# Patient Record
Sex: MALE | Race: WHITE | NOT HISPANIC OR LATINO
[De-identification: names, ages, dates, MRNs, and addresses within clinical notes are randomized per-mention and may not be internally consistent; named-entity substitution may affect disease eponyms.]

---

## 2017-06-12 ENCOUNTER — TRANSCRIPTION ENCOUNTER (OUTPATIENT)
Age: 67
End: 2017-06-12

## 2017-06-12 ENCOUNTER — APPOINTMENT (OUTPATIENT)
Dept: UROLOGY | Facility: CLINIC | Age: 67
End: 2017-06-12

## 2017-06-12 ENCOUNTER — RECORD ABSTRACTING (OUTPATIENT)
Age: 67
End: 2017-06-12

## 2017-06-12 VITALS — WEIGHT: 155 LBS | BODY MASS INDEX: 22.19 KG/M2 | HEIGHT: 70 IN

## 2017-06-12 DIAGNOSIS — M67.919 UNSPECIFIED DISORDER OF SYNOVIUM AND TENDON, UNSPECIFIED SHOULDER: ICD-10-CM

## 2017-06-12 DIAGNOSIS — H91.90 UNSPECIFIED HEARING LOSS, UNSPECIFIED EAR: ICD-10-CM

## 2017-06-12 DIAGNOSIS — K46.9 UNSPECIFIED ABDOMINAL HERNIA W/OUT OBSTRUCTION OR GANGRENE: ICD-10-CM

## 2017-06-12 DIAGNOSIS — Z82.0 FAMILY HISTORY OF EPILEPSY AND OTHER DISEASES OF THE NERVOUS SYSTEM: ICD-10-CM

## 2017-06-12 DIAGNOSIS — Z87.2 PERSONAL HISTORY OF DISEASES OF THE SKIN AND SUBCUTANEOUS TISSUE: ICD-10-CM

## 2017-06-12 DIAGNOSIS — Z86.69 PERSONAL HISTORY OF OTHER DISEASES OF THE NERVOUS SYSTEM AND SENSE ORGANS: ICD-10-CM

## 2017-06-12 DIAGNOSIS — Z78.9 OTHER SPECIFIED HEALTH STATUS: ICD-10-CM

## 2017-06-12 DIAGNOSIS — Z87.19 PERSONAL HISTORY OF OTHER DISEASES OF THE DIGESTIVE SYSTEM: ICD-10-CM

## 2017-06-12 DIAGNOSIS — Z80.0 FAMILY HISTORY OF MALIGNANT NEOPLASM OF DIGESTIVE ORGANS: ICD-10-CM

## 2017-06-15 LAB
APPEARANCE: CLEAR
BACTERIA UR CULT: NORMAL
BILIRUBIN URINE: NEGATIVE
BLOOD URINE: NEGATIVE
COLOR: YELLOW
CORE LAB FLUID CYTOLOGY: NORMAL
GLUCOSE QUALITATIVE U: NORMAL MG/DL
KETONES URINE: NEGATIVE
LEUKOCYTE ESTERASE URINE: NEGATIVE
NITRITE URINE: NEGATIVE
PH URINE: 5
PROLACTIN SERPL-MCNC: 6.1 NG/ML
PROTEIN URINE: NEGATIVE MG/DL
PSA SERPL-MCNC: 7.07 NG/ML
SPECIFIC GRAVITY URINE: 1.02
TESTOST SERPL-MCNC: 526.8 NG/DL
UROBILINOGEN URINE: NORMAL MG/DL

## 2017-06-19 ENCOUNTER — TRANSCRIPTION ENCOUNTER (OUTPATIENT)
Age: 67
End: 2017-06-19

## 2017-06-22 ENCOUNTER — OUTPATIENT (OUTPATIENT)
Dept: OUTPATIENT SERVICES | Facility: HOSPITAL | Age: 67
LOS: 1 days | End: 2017-06-22
Payer: COMMERCIAL

## 2017-06-22 PROCEDURE — A9585: CPT

## 2017-06-22 PROCEDURE — 72197 MRI PELVIS W/O & W/DYE: CPT

## 2017-06-22 PROCEDURE — 72197 MRI PELVIS W/O & W/DYE: CPT | Mod: 26

## 2017-06-26 ENCOUNTER — TRANSCRIPTION ENCOUNTER (OUTPATIENT)
Age: 67
End: 2017-06-26

## 2017-06-26 ENCOUNTER — APPOINTMENT (OUTPATIENT)
Dept: UROLOGY | Facility: CLINIC | Age: 67
End: 2017-06-26

## 2017-06-26 VITALS — HEART RATE: 72 BPM | RESPIRATION RATE: 16 BRPM | SYSTOLIC BLOOD PRESSURE: 128 MMHG | DIASTOLIC BLOOD PRESSURE: 74 MMHG

## 2017-07-11 ENCOUNTER — OTHER (OUTPATIENT)
Age: 67
End: 2017-07-11

## 2017-07-24 ENCOUNTER — APPOINTMENT (OUTPATIENT)
Dept: UROLOGY | Facility: CLINIC | Age: 67
End: 2017-07-24

## 2017-08-18 ENCOUNTER — APPOINTMENT (OUTPATIENT)
Dept: UROLOGY | Facility: CLINIC | Age: 67
End: 2017-08-18
Payer: COMMERCIAL

## 2017-08-18 PROCEDURE — 99213 OFFICE O/P EST LOW 20 MIN: CPT

## 2017-09-25 ENCOUNTER — APPOINTMENT (OUTPATIENT)
Dept: UROLOGY | Facility: CLINIC | Age: 67
End: 2017-09-25

## 2017-11-12 ENCOUNTER — LABORATORY RESULT (OUTPATIENT)
Age: 67
End: 2017-11-12

## 2017-11-13 ENCOUNTER — TRANSCRIPTION ENCOUNTER (OUTPATIENT)
Age: 67
End: 2017-11-13

## 2017-11-16 ENCOUNTER — MOBILE ON CALL (OUTPATIENT)
Age: 67
End: 2017-11-16

## 2017-11-20 ENCOUNTER — APPOINTMENT (OUTPATIENT)
Dept: UROLOGY | Facility: CLINIC | Age: 67
End: 2017-11-20
Payer: COMMERCIAL

## 2017-11-20 PROCEDURE — 99213 OFFICE O/P EST LOW 20 MIN: CPT

## 2017-11-21 LAB
APPEARANCE: CLEAR
BACTERIA: NEGATIVE
BILIRUBIN URINE: NEGATIVE
BLOOD URINE: NEGATIVE
COLOR: YELLOW
GLUCOSE QUALITATIVE U: NEGATIVE MG/DL
HYALINE CASTS: 0 /LPF
KETONES URINE: NEGATIVE
LEUKOCYTE ESTERASE URINE: NEGATIVE
MICROSCOPIC-UA: NORMAL
NITRITE URINE: NEGATIVE
PH URINE: 5
PROTEIN URINE: NEGATIVE MG/DL
RED BLOOD CELLS URINE: 1 /HPF
SPECIFIC GRAVITY URINE: 1.02
SQUAMOUS EPITHELIAL CELLS: 0 /HPF
UROBILINOGEN URINE: NEGATIVE MG/DL
WHITE BLOOD CELLS URINE: 1 /HPF

## 2018-02-16 ENCOUNTER — TRANSCRIPTION ENCOUNTER (OUTPATIENT)
Age: 68
End: 2018-02-16

## 2018-02-20 LAB — PSA SERPL-MCNC: 5.43 NG/ML

## 2018-02-26 ENCOUNTER — APPOINTMENT (OUTPATIENT)
Dept: UROLOGY | Facility: CLINIC | Age: 68
End: 2018-02-26
Payer: COMMERCIAL

## 2018-02-26 PROCEDURE — 51798 US URINE CAPACITY MEASURE: CPT

## 2018-02-26 PROCEDURE — 99214 OFFICE O/P EST MOD 30 MIN: CPT

## 2018-07-12 ENCOUNTER — TRANSCRIPTION ENCOUNTER (OUTPATIENT)
Age: 68
End: 2018-07-12

## 2018-07-16 LAB — PSA SERPL-MCNC: 4.08 NG/ML

## 2018-07-19 ENCOUNTER — FORM ENCOUNTER (OUTPATIENT)
Age: 68
End: 2018-07-19

## 2018-07-20 ENCOUNTER — TRANSCRIPTION ENCOUNTER (OUTPATIENT)
Age: 68
End: 2018-07-20

## 2018-07-20 ENCOUNTER — OUTPATIENT (OUTPATIENT)
Dept: OUTPATIENT SERVICES | Facility: HOSPITAL | Age: 68
LOS: 1 days | End: 2018-07-20
Payer: COMMERCIAL

## 2018-07-20 ENCOUNTER — APPOINTMENT (OUTPATIENT)
Dept: MRI IMAGING | Facility: HOSPITAL | Age: 68
End: 2018-07-20
Payer: COMMERCIAL

## 2018-07-20 LAB
4K SCORE CALCULATION: 4 %
FREE PSA: 0.6 NG/ML
PERCENT FREE PSA: 14 %
TOTAL PSA: 4.34 NG/ML

## 2018-07-20 PROCEDURE — 72197 MRI PELVIS W/O & W/DYE: CPT | Mod: 26

## 2018-07-20 PROCEDURE — 72197 MRI PELVIS W/O & W/DYE: CPT

## 2018-07-20 PROCEDURE — A9585: CPT

## 2018-07-24 ENCOUNTER — TRANSCRIPTION ENCOUNTER (OUTPATIENT)
Age: 68
End: 2018-07-24

## 2018-07-30 ENCOUNTER — OUTPATIENT (OUTPATIENT)
Dept: OUTPATIENT SERVICES | Facility: HOSPITAL | Age: 68
LOS: 1 days | End: 2018-07-30
Payer: COMMERCIAL

## 2018-07-30 ENCOUNTER — APPOINTMENT (OUTPATIENT)
Dept: CT IMAGING | Facility: HOSPITAL | Age: 68
End: 2018-07-30

## 2018-07-30 PROCEDURE — 70480 CT ORBIT/EAR/FOSSA W/O DYE: CPT

## 2018-07-30 PROCEDURE — 70480 CT ORBIT/EAR/FOSSA W/O DYE: CPT | Mod: 26

## 2018-08-06 ENCOUNTER — APPOINTMENT (OUTPATIENT)
Dept: UROLOGY | Facility: CLINIC | Age: 68
End: 2018-08-06
Payer: COMMERCIAL

## 2018-08-06 PROCEDURE — 51741 ELECTRO-UROFLOWMETRY FIRST: CPT

## 2018-08-06 PROCEDURE — 99214 OFFICE O/P EST MOD 30 MIN: CPT | Mod: 25

## 2018-09-06 ENCOUNTER — NON-APPOINTMENT (OUTPATIENT)
Age: 68
End: 2018-09-06

## 2018-09-06 ENCOUNTER — APPOINTMENT (OUTPATIENT)
Dept: FAMILY MEDICINE | Facility: CLINIC | Age: 68
End: 2018-09-06
Payer: COMMERCIAL

## 2018-09-06 VITALS
HEIGHT: 70 IN | OXYGEN SATURATION: 98 % | TEMPERATURE: 98.2 F | WEIGHT: 155 LBS | DIASTOLIC BLOOD PRESSURE: 75 MMHG | BODY MASS INDEX: 22.19 KG/M2 | HEART RATE: 67 BPM | SYSTOLIC BLOOD PRESSURE: 154 MMHG

## 2018-09-06 VITALS — DIASTOLIC BLOOD PRESSURE: 78 MMHG | SYSTOLIC BLOOD PRESSURE: 150 MMHG

## 2018-09-06 DIAGNOSIS — Z86.19 PERSONAL HISTORY OF OTHER INFECTIOUS AND PARASITIC DISEASES: ICD-10-CM

## 2018-09-06 PROCEDURE — 93000 ELECTROCARDIOGRAM COMPLETE: CPT

## 2018-09-06 PROCEDURE — 99204 OFFICE O/P NEW MOD 45 MIN: CPT | Mod: 25

## 2018-09-06 RX ORDER — CELECOXIB 100 MG/1
100 CAPSULE ORAL
Refills: 0 | Status: DISCONTINUED | COMMUNITY
End: 2018-09-06

## 2018-09-06 RX ORDER — FINASTERIDE 5 MG/1
5 TABLET, FILM COATED ORAL DAILY
Qty: 90 | Refills: 1 | Status: DISCONTINUED | COMMUNITY
Start: 2017-11-20 | End: 2018-09-06

## 2018-09-06 RX ORDER — PAPAVERINE HYDROCHLORIDE 30 MG/ML
30 INJECTION, SOLUTION INTRAVENOUS
Qty: 2 | Refills: 0 | Status: DISCONTINUED | COMMUNITY
Start: 2018-08-06 | End: 2018-09-06

## 2018-09-06 RX ORDER — LEVOCETIRIZINE DIHYDROCHLORIDE 5 MG/1
5 TABLET, FILM COATED ORAL
Refills: 0 | Status: DISCONTINUED | COMMUNITY
End: 2018-09-06

## 2018-09-06 NOTE — HISTORY OF PRESENT ILLNESS
[No Pertinent Cardiac History] : no history of aortic stenosis, atrial fibrillation, coronary artery disease, recent myocardial infarction, or implantable device/pacemaker [No Pertinent Pulmonary History] : no history of asthma, COPD, sleep apnea, or smoking [No Adverse Anesthesia Reaction] : no adverse anesthesia reaction in self or family member [(Patient denies any chest pain, claudication, dyspnea on exertion, orthopnea, palpitations or syncope)] : Patient denies any chest pain, claudication, dyspnea on exertion, orthopnea, palpitations or syncope [FreeTextEntry1] : R tympanoplasty [FreeTextEntry2] : 9/27/18 [FreeTextEntry3] : Dr Mancia [FreeTextEntry4] : 68 yo male here for preop clearance for R tympanoplasty. Pt with history of severe otitis of the right ear with resulting TM collapse and tinnitis/hearing loss. Going for surgery with Dr Mancia 9/27. Feeling well  today, no complaints. \par \par Last colonoscopy 10 years ago, needs to schedule f/u. \par \par Tdap- 7/06\par Td 2016\par Prevnar 13 01/16\par pneumovax 04/17\par Shingrix 3/2018 7/2018

## 2018-09-06 NOTE — RESULTS/DATA
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 Will get old EKG to compare

## 2018-09-06 NOTE — ASSESSMENT
[High Risk Surgery - Intraperitoneal, Intrathoracic or Supringuinal Vascular Procedures] : High Risk Surgery - Intraperitoneal, Intrathoracic or Supringuinal Vascular Procedures - No (0) [Ischemic Heart Disease] : Ischemic Heart Disease - No (0) [Congestive Heart Failure] : Congestive Heart Failure - No (0) [Prior Cerebrovascular Accident or TIA] : Prior Cerebrovascular Accident or TIA - No (0) [Creatinine >= 2mg/dL (1 Point)] : Creatinine >= 2mg/dL - No (0) [Insulin-dependent Diabetic (1 Point)] : Insulin-dependent Diabetic - No (0) [0] : 0 , RCRI Class: I, Risk of Post-Op Cardiac Complications: 0.4%, Procedure Risk: Low-Risk [Patient Optimized for Surgery] : Patient optimized for surgery [Modify anti-platelet treatment prior to procedure] : Modify anti-platelet treatment prior to procedure [Modify medications prior to procedure] : Modify medications prior to procedure [As per surgery] : as per surgery [FreeTextEntry4] : 66 yo male here for preop clearance for tympanoplasty. Patient is low risk for intermediate risk procedure. Medically optimized at this time.  [FreeTextEntry6] : stop 1 week prior [FreeTextEntry7] : NSAIDS stop 1 week prior

## 2018-09-27 ENCOUNTER — OUTPATIENT (OUTPATIENT)
Dept: OUTPATIENT SERVICES | Facility: HOSPITAL | Age: 68
LOS: 1 days | Discharge: HOME | End: 2018-09-27

## 2018-09-27 ENCOUNTER — RESULT REVIEW (OUTPATIENT)
Age: 68
End: 2018-09-27

## 2018-10-04 LAB — SURGICAL PATHOLOGY STUDY: SIGNIFICANT CHANGE UP

## 2018-12-21 ENCOUNTER — MEDICATION RENEWAL (OUTPATIENT)
Age: 68
End: 2018-12-21

## 2019-01-25 ENCOUNTER — APPOINTMENT (OUTPATIENT)
Dept: UROLOGY | Facility: CLINIC | Age: 69
End: 2019-01-25
Payer: MEDICARE

## 2019-01-25 VITALS — DIASTOLIC BLOOD PRESSURE: 70 MMHG | SYSTOLIC BLOOD PRESSURE: 130 MMHG

## 2019-01-25 PROCEDURE — 54235 NJX CORPORA CAVERNOSA RX AGT: CPT

## 2019-01-25 PROCEDURE — 93980 PENILE VASCULAR STUDY: CPT

## 2019-01-25 PROCEDURE — 99213 OFFICE O/P EST LOW 20 MIN: CPT | Mod: 25

## 2019-01-25 NOTE — HISTORY OF PRESENT ILLNESS
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 He notes marked improvement in urinary urgency. He still notes decreased urinary stream.\par He feels the libido has diminished since on finasteride; although, acknowledges diminished libido prior to starting. \par \par 8.6.2018\par MRI no evidence of areas concern\par 4K low risk\par complaining of a penile curvature for the last six months\par ? worsened recently does not preclude SI\par not painful\par libido has diminished; had preceded the implementation of finasteride\par low ejaculate volume since alfuzosin\par continues to be sexually active but less frequent\par Urinary symptoms improved on finasteride \par \par Patient is currently experiencing weak stream, but no urinary incontinence and no urinary urgency  \par The patient presents with complaints of nocturia (once (marked improvement)). \par The patient presents with complaints of erectile dysfunction (able to achieve intercourse; erections 7/10. curvature 30 %; ? change over last 6 months) \par  \par 1.25.2019\par patient returns for DUS\par he states that penile curvature preclude sexual intercourse\par he states that at the point of curvature there is penile instability\par he has avoided sexual intercourse

## 2019-01-25 NOTE — ASSESSMENT
[FreeTextEntry1] : The DUS today demonstrates excellent erection and rigidity.\par Patient was concerned that curvature/Peyronie precluded erection.\par Erection detumesced after 4 hours.\par Reassured\par We discussed treatment of Peyronie's disease with Xiaflex.\par We discussed at length the limitation of Xiaflex especially with reference to "waisting"\par We discussed risks and complication including but not limited to penile fracture.\par We discussed sildenafil so that he can assess impairment\par Discussed dose escalation by as needed (with maximum of 5 tablets =100mg)\par Warned re priapism risk and need for immediate intervention if erection lasts more than 2 hours.  Patient instructed to report to an emergency room and explicitly inform staff of his condition and need for treatment.\par Informational material provided to patient\par He will consider and return to assess progress

## 2019-03-14 ENCOUNTER — TRANSCRIPTION ENCOUNTER (OUTPATIENT)
Age: 69
End: 2019-03-14

## 2019-03-26 ENCOUNTER — MEDICATION RENEWAL (OUTPATIENT)
Age: 69
End: 2019-03-26

## 2019-07-25 ENCOUNTER — TRANSCRIPTION ENCOUNTER (OUTPATIENT)
Age: 69
End: 2019-07-25

## 2019-07-26 ENCOUNTER — APPOINTMENT (OUTPATIENT)
Dept: UROLOGY | Facility: CLINIC | Age: 69
End: 2019-07-26

## 2019-07-26 LAB — PSA SERPL-MCNC: 3.6 NG/ML

## 2020-01-30 ENCOUNTER — FORM ENCOUNTER (OUTPATIENT)
Age: 70
End: 2020-01-30

## 2020-01-31 ENCOUNTER — APPOINTMENT (OUTPATIENT)
Dept: UROLOGY | Facility: CLINIC | Age: 70
End: 2020-01-31
Payer: MEDICARE

## 2020-01-31 ENCOUNTER — OUTPATIENT (OUTPATIENT)
Dept: OUTPATIENT SERVICES | Facility: HOSPITAL | Age: 70
LOS: 1 days | End: 2020-01-31

## 2020-01-31 ENCOUNTER — APPOINTMENT (OUTPATIENT)
Dept: ULTRASOUND IMAGING | Facility: CLINIC | Age: 70
End: 2020-01-31
Payer: MEDICARE

## 2020-01-31 DIAGNOSIS — N48.6 INDURATION PENIS PLASTICA: ICD-10-CM

## 2020-01-31 PROCEDURE — 76770 US EXAM ABDO BACK WALL COMP: CPT | Mod: 26

## 2020-01-31 PROCEDURE — 51741 ELECTRO-UROFLOWMETRY FIRST: CPT

## 2020-01-31 PROCEDURE — 99214 OFFICE O/P EST MOD 30 MIN: CPT | Mod: 25

## 2020-01-31 PROCEDURE — 51798 US URINE CAPACITY MEASURE: CPT | Mod: 59

## 2020-01-31 RX ORDER — TADALAFIL 20 MG/1
20 TABLET ORAL
Qty: 10 | Refills: 0 | Status: ACTIVE | COMMUNITY
Start: 2020-01-31 | End: 1900-01-01

## 2020-01-31 NOTE — ASSESSMENT
[FreeTextEntry1] : Urinary symptoms improved on Uroxatral and Finasteride\par Patient however complains of the effect on sexual function.  He states that he is able to get an erection and have intercourse however he has no sense of enjoyment due to the poor quality of the ejaculate on Uroxatral and finasteride.  We discussed proceeding with intervention such as UROLIFT and attempt to stop medications.  He is reluctant to do this.  We discussed the UROLIFT procedure.\par \par We discussed the utilization of Cialis 5 mg daily in attempt to treat his urinary and sexual dysfunction.  He wished to proceed with this at this time.\par \par We discussed proceeding with a renal and bladder ultrasound.\par \par His PSA data was reviewed.  He has had a reduction in PSA on finasteride.  He is status post biopsy.  He is status post MRIs and a 4K.  We will repeat his PSA at this point.  His examination is unremarkable.\par \par Elevated PSA \par .s/p TUSGbx\par s/p MRI x2\par s/p 4K\par Will repeat PSA\par discussed  repeating PSA and or MRI\par continue to Monitor\par ,

## 2020-01-31 NOTE — HISTORY OF PRESENT ILLNESS
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 He notes marked improvement in urinary urgency. He still notes decreased urinary stream.\par He feels the libido has diminished since on finasteride; although, acknowledges diminished libido prior to starting. \par \par 8.6.2018\par MRI no evidence of areas concern\par 4K low risk\par complaining of a penile curvature for the last six months\par ? worsened recently does not preclude SI\par not painful\par libido has diminished; had preceded the implementation of finasteride\par low ejaculate volume since alfuzosin\par continues to be sexually active but less frequent\par Urinary symptoms improved on finasteride \par \par Patient is currently experiencing weak stream, but no urinary incontinence and no urinary urgency  \par The patient presents with complaints of nocturia (once (marked improvement)). \par The patient presents with complaints of erectile dysfunction (able to achieve intercourse; erections 7/10. curvature 30 %; ? change over last 6 months) \par  \par 1.25.2019\par patient returns for DUS\par he states that penile curvature preclude sexual intercourse\par he states that at the point of curvature there is penile instability\par he has avoided sexual intercourse\par \par \par 1.31.2020\par sexual dysfunction takes viagra\par it works but "annoying" to have to take medication\par low ejaculate volume on alfuzosin\par "sex less pleasurable"\par \par urinary symptoms; IPSS 13\par slow stream in AM before takes medication\par during day does well\par urinary retention after ear surgery\par

## 2020-01-31 NOTE — PHYSICAL EXAM
[General Appearance - Well Developed] : well developed [Normal Appearance] : normal appearance [Abdomen Soft] : soft [Abdomen Tenderness] : non-tender [Costovertebral Angle Tenderness] : no ~M costovertebral angle tenderness [Urethral Meatus] : meatus normal [Penis Abnormality] : normal circumcised penis [Epididymis] : the epididymides were normal [Testes Tenderness] : no tenderness of the testes [Prostate Tenderness] : the prostate was not tender [No Prostate Nodules] : no prostate nodules [Prostate Size ___ (0-4)] : prostate size [unfilled] (scale: 0-4) [Skin Color & Pigmentation] : normal skin color and pigmentation [Edema] : no peripheral edema [Exaggerated Use Of Accessory Muscles For Inspiration] : no accessory muscle use [Oriented To Time, Place, And Person] : oriented to person, place, and time [Normal Station and Gait] : the gait and station were normal for the patient's age [Inguinal Lymph Nodes Enlarged Bilaterally] : inguinal [FreeTextEntry1] : PVR  33

## 2020-02-02 LAB
APPEARANCE: ABNORMAL
BACTERIA: NEGATIVE
BILIRUBIN URINE: NEGATIVE
BLOOD URINE: NEGATIVE
COLOR: YELLOW
GLUCOSE QUALITATIVE U: NEGATIVE
HYALINE CASTS: 0 /LPF
KETONES URINE: NEGATIVE
LEUKOCYTE ESTERASE URINE: NEGATIVE
MICROSCOPIC-UA: NORMAL
NITRITE URINE: NEGATIVE
PH URINE: 5
PROTEIN URINE: NEGATIVE
RED BLOOD CELLS URINE: 1 /HPF
SPECIFIC GRAVITY URINE: 1.02
SQUAMOUS EPITHELIAL CELLS: 0 /HPF
UROBILINOGEN URINE: NORMAL
WHITE BLOOD CELLS URINE: 0 /HPF

## 2020-02-03 ENCOUNTER — TRANSCRIPTION ENCOUNTER (OUTPATIENT)
Age: 70
End: 2020-02-03

## 2020-02-03 LAB — PSA SERPL-MCNC: 3.5 NG/ML

## 2020-04-14 ENCOUNTER — APPOINTMENT (OUTPATIENT)
Dept: FAMILY MEDICINE | Facility: CLINIC | Age: 70
End: 2020-04-14

## 2020-04-27 ENCOUNTER — APPOINTMENT (OUTPATIENT)
Dept: UROLOGY | Facility: CLINIC | Age: 70
End: 2020-04-27

## 2020-06-24 ENCOUNTER — APPOINTMENT (OUTPATIENT)
Dept: DERMATOLOGY | Facility: CLINIC | Age: 70
End: 2020-06-24

## 2020-12-17 ENCOUNTER — TRANSCRIPTION ENCOUNTER (OUTPATIENT)
Age: 70
End: 2020-12-17

## 2020-12-17 ENCOUNTER — RX RENEWAL (OUTPATIENT)
Age: 70
End: 2020-12-17

## 2020-12-21 ENCOUNTER — TRANSCRIPTION ENCOUNTER (OUTPATIENT)
Age: 70
End: 2020-12-21

## 2021-01-05 ENCOUNTER — TRANSCRIPTION ENCOUNTER (OUTPATIENT)
Age: 71
End: 2021-01-05

## 2021-01-05 LAB
ANION GAP SERPL CALC-SCNC: 13 MMOL/L
APPEARANCE: CLEAR
BACTERIA: NEGATIVE
BILIRUBIN URINE: NEGATIVE
BLOOD URINE: NEGATIVE
BUN SERPL-MCNC: 12 MG/DL
CALCIUM SERPL-MCNC: 9.6 MG/DL
CHLORIDE SERPL-SCNC: 105 MMOL/L
CO2 SERPL-SCNC: 24 MMOL/L
COLOR: YELLOW
CREAT SERPL-MCNC: 1.07 MG/DL
GLUCOSE QUALITATIVE U: NEGATIVE
GLUCOSE SERPL-MCNC: 74 MG/DL
HYALINE CASTS: 3 /LPF
KETONES URINE: NORMAL
LEUKOCYTE ESTERASE URINE: NEGATIVE
MICROSCOPIC-UA: NORMAL
NITRITE URINE: NEGATIVE
PH URINE: 5.5
POTASSIUM SERPL-SCNC: 4.7 MMOL/L
PROTEIN URINE: NORMAL
PSA SERPL-MCNC: 3.45 NG/ML
RED BLOOD CELLS URINE: 2 /HPF
SODIUM SERPL-SCNC: 143 MMOL/L
SPECIFIC GRAVITY URINE: 1.03
SQUAMOUS EPITHELIAL CELLS: 1 /HPF
UROBILINOGEN URINE: NORMAL
WHITE BLOOD CELLS URINE: 1 /HPF

## 2021-01-06 ENCOUNTER — TRANSCRIPTION ENCOUNTER (OUTPATIENT)
Age: 71
End: 2021-01-06

## 2021-01-08 ENCOUNTER — APPOINTMENT (OUTPATIENT)
Dept: UROLOGY | Facility: CLINIC | Age: 71
End: 2021-01-08
Payer: MEDICARE

## 2021-01-08 PROCEDURE — 99213 OFFICE O/P EST LOW 20 MIN: CPT

## 2021-01-08 NOTE — HISTORY OF PRESENT ILLNESS
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 He notes marked improvement in urinary urgency. He still notes decreased urinary stream.\par He feels the libido has diminished since on finasteride; although, acknowledges diminished libido prior to starting. \par \par 8.6.2018\par MRI no evidence of areas concern\par 4K low risk\par complaining of a penile curvature for the last six months\par ? worsened recently does not preclude SI\par not painful\par libido has diminished; had preceded the implementation of finasteride\par low ejaculate volume since alfuzosin\par continues to be sexually active but less frequent\par Urinary symptoms improved on finasteride \par \par Patient is currently experiencing weak stream, but no urinary incontinence and no urinary urgency  \par The patient presents with complaints of nocturia (once (marked improvement)). \par The patient presents with complaints of erectile dysfunction (able to achieve intercourse; erections 7/10. curvature 30 %; ? change over last 6 months) \par  \par 1.25.2019\par patient returns for DUS\par he states that penile curvature preclude sexual intercourse\par he states that at the point of curvature there is penile instability\par he has avoided sexual intercourse\par \par \par 1.31.2020\par sexual dysfunction takes viagra\par it works but "annoying" to have to take medication\par low ejaculate volume on alfuzosin\par "sex less pleasurable"\par \par urinary symptoms; IPSS 13\par slow stream in AM before takes medication\par during day does well\par urinary retention after ear surgery\par \par 1.8.2021\par patient not havng sexual intercourse \par "agreement" \par did not enjoy having taking medication\par \par IPSS 10\par ? dribbling\par on afluzosin and finasteride [Urinary Incontinence] : no urinary incontinence [Urinary Retention] : no urinary retention [Straining] : no straining

## 2021-01-08 NOTE — ASSESSMENT
[FreeTextEntry1] : Urinary symptoms improved on Uroxatral and Finasteride\par Patient however complains of the effect on sexual function.  He states that he is able to get an erection and have intercourse however he has no sense of enjoyment due to the poor quality of the ejaculate on Uroxatral and finasteride.  We discussed proceeding with intervention such as UROLIFT and attempt to stop medications.  He is reluctant to do this.  We discussed the UROLIFT procedure.\par \par We discussed the utilization of Cialis 5 mg daily in attempt to treat his urinary and sexual dysfunction.  He wished to proceed with this at this time.\par \par We discussed proceeding with a renal and bladder ultrasound.\par \par His PSA data was reviewed.  He has had a reduction in PSA on finasteride.  He is status post biopsy.  He is status post MRIs and a 4K.  We will repeat his PSA at this point.  His examination is unremarkable.\par \par Elevated PSA \par .s/p TUSGbx\par s/p MRI x2\par s/p 4K\par Will repeat PSA\par discussed  repeating PSA and or MRI\par continue to Monitor\par ,\par 1.8.2021\par labs reviewed\par PSA stable after finasteride\par s/p MRI \par s/p biopsy\par EXAM CHANGED with assymetry of (R lobe greated than left)  recommend repeat MRI\par The [facility]  expressed fully understanding of the information provided, the consequences and the management.\par \par LUTs \par low flow and PVR (flow nondiagnostic\par on maximal management\par interested in UROLIFT after COVID crisis\par patient has been given Informational materials given to patient.\par \par \par sexual function\par responds to to PD5 with excellent erection\par however not willing to take PD5\par couple is not interested in addressing\par

## 2021-01-08 NOTE — PHYSICAL EXAM
[General Appearance - Well Developed] : well developed [Normal Appearance] : normal appearance [Abdomen Soft] : soft [Abdomen Tenderness] : non-tender [Costovertebral Angle Tenderness] : no ~M costovertebral angle tenderness [Urethral Meatus] : meatus normal [Penis Abnormality] : normal circumcised penis [Epididymis] : the epididymides were normal [Testes Tenderness] : no tenderness of the testes [Prostate Tenderness] : the prostate was not tender [No Prostate Nodules] : no prostate nodules [FreeTextEntry1] : PVR 4; prostate assymetry R>L; ? irregular

## 2021-02-05 ENCOUNTER — APPOINTMENT (OUTPATIENT)
Dept: MRI IMAGING | Facility: HOSPITAL | Age: 71
End: 2021-02-05

## 2021-02-05 ENCOUNTER — OUTPATIENT (OUTPATIENT)
Dept: OUTPATIENT SERVICES | Facility: HOSPITAL | Age: 71
LOS: 1 days | End: 2021-02-05
Payer: MEDICARE

## 2021-02-05 PROCEDURE — A9585: CPT

## 2021-02-05 PROCEDURE — 72197 MRI PELVIS W/O & W/DYE: CPT

## 2021-02-05 PROCEDURE — G1004: CPT

## 2021-02-05 PROCEDURE — 72197 MRI PELVIS W/O & W/DYE: CPT | Mod: 26,ME

## 2021-02-06 ENCOUNTER — TRANSCRIPTION ENCOUNTER (OUTPATIENT)
Age: 71
End: 2021-02-06

## 2021-02-08 ENCOUNTER — TRANSCRIPTION ENCOUNTER (OUTPATIENT)
Age: 71
End: 2021-02-08

## 2021-03-23 ENCOUNTER — NON-APPOINTMENT (OUTPATIENT)
Age: 71
End: 2021-03-23

## 2021-03-23 ENCOUNTER — APPOINTMENT (OUTPATIENT)
Dept: FAMILY MEDICINE | Facility: CLINIC | Age: 71
End: 2021-03-23
Payer: MEDICARE

## 2021-03-23 VITALS
HEIGHT: 70 IN | RESPIRATION RATE: 16 BRPM | SYSTOLIC BLOOD PRESSURE: 148 MMHG | TEMPERATURE: 97.9 F | BODY MASS INDEX: 21.47 KG/M2 | OXYGEN SATURATION: 98 % | WEIGHT: 150 LBS | HEART RATE: 65 BPM | DIASTOLIC BLOOD PRESSURE: 77 MMHG

## 2021-03-23 VITALS — DIASTOLIC BLOOD PRESSURE: 78 MMHG | SYSTOLIC BLOOD PRESSURE: 132 MMHG

## 2021-03-23 DIAGNOSIS — R05 COUGH: ICD-10-CM

## 2021-03-23 DIAGNOSIS — Z13.21 ENCOUNTER FOR SCREENING FOR NUTRITIONAL DISORDER: ICD-10-CM

## 2021-03-23 DIAGNOSIS — R21 RASH AND OTHER NONSPECIFIC SKIN ERUPTION: ICD-10-CM

## 2021-03-23 DIAGNOSIS — Z13.29 ENCOUNTER FOR SCREENING FOR OTHER SUSPECTED ENDOCRINE DISORDER: ICD-10-CM

## 2021-03-23 DIAGNOSIS — Z13.1 ENCOUNTER FOR SCREENING FOR DIABETES MELLITUS: ICD-10-CM

## 2021-03-23 PROCEDURE — G0439: CPT

## 2021-03-23 PROCEDURE — 99214 OFFICE O/P EST MOD 30 MIN: CPT | Mod: 25

## 2021-03-23 PROCEDURE — 36415 COLL VENOUS BLD VENIPUNCTURE: CPT

## 2021-03-23 PROCEDURE — 93000 ELECTROCARDIOGRAM COMPLETE: CPT

## 2021-03-23 RX ORDER — PAPAVERINE HYDROCHLORIDE 30 MG/ML
30 INJECTION, SOLUTION INTRAVENOUS
Qty: 2 | Refills: 0 | Status: DISCONTINUED | COMMUNITY
Start: 2019-01-08 | End: 2021-03-23

## 2021-03-23 NOTE — HISTORY OF PRESENT ILLNESS
[de-identified] : 69 yo male here for CPE. \par \par Patient suffers from chronic heartburn, worsening over last few months. Has had negative esophageal manometry in the past, 20 years ago. Last night had bad reflux that woke him up. H/o small hiatal hernia. Sometimes feels like he has to belch but it's getting stuck. Mom with h/o achalasia. Last colonoscopy 11 years ago. Takes periodic omeprazole. Also with history of colonic polyps. \par \par Admitting to periodic right sided chest. Mostly at rest. Pain is dull. No FH of heart disease. Not exercising currently. \par \par C/o rash on his back and legs after the COVID vaccine. Took h2 blocker and benadryl for a while with minimal improvement. Last week he did a prednisone taper which improved rash.

## 2021-03-23 NOTE — ASSESSMENT
[FreeTextEntry1] : CPE- Well exam, comprehensive labs ordered. EKG unchanged. GI referral for colonoscopy. F/u labs

## 2021-03-23 NOTE — HEALTH RISK ASSESSMENT
[Good] : ~his/her~  mood as  good [0] : 2) Feeling down, depressed, or hopeless: Not at all (0) [With Significant Other] : lives with significant other [Employed] : employed [] :  [Fully functional (bathing, dressing, toileting, transferring, walking, feeding)] : Fully functional (bathing, dressing, toileting, transferring, walking, feeding) [Fully functional (using the telephone, shopping, preparing meals, housekeeping, doing laundry, using] : Fully functional and needs no help or supervision to perform IADLs (using the telephone, shopping, preparing meals, housekeeping, doing laundry, using transportation, managing medications and managing finances)

## 2021-03-26 DIAGNOSIS — R79.89 OTHER SPECIFIED ABNORMAL FINDINGS OF BLOOD CHEMISTRY: ICD-10-CM

## 2021-03-26 LAB
25(OH)D3 SERPL-MCNC: 40.3 NG/ML
ALBUMIN SERPL ELPH-MCNC: 4.4 G/DL
ALP BLD-CCNC: 98 U/L
ALT SERPL-CCNC: 19 U/L
ANION GAP SERPL CALC-SCNC: 11 MMOL/L
APPEARANCE: CLEAR
AST SERPL-CCNC: 19 U/L
BACTERIA: NEGATIVE
BASOPHILS # BLD AUTO: 0.04 K/UL
BASOPHILS NFR BLD AUTO: 0.6 %
BILIRUB SERPL-MCNC: 0.5 MG/DL
BILIRUBIN URINE: NEGATIVE
BLOOD URINE: NEGATIVE
BUN SERPL-MCNC: 13 MG/DL
CALCIUM SERPL-MCNC: 8.8 MG/DL
CHLORIDE SERPL-SCNC: 106 MMOL/L
CHOLEST SERPL-MCNC: 212 MG/DL
CO2 SERPL-SCNC: 26 MMOL/L
COLOR: NORMAL
CREAT SERPL-MCNC: 0.92 MG/DL
EOSINOPHIL # BLD AUTO: 0.24 K/UL
EOSINOPHIL NFR BLD AUTO: 3.5 %
ESTIMATED AVERAGE GLUCOSE: 105 MG/DL
FOLATE SERPL-MCNC: >20 NG/ML
GLUCOSE QUALITATIVE U: NEGATIVE
GLUCOSE SERPL-MCNC: 103 MG/DL
HBA1C MFR BLD HPLC: 5.3 %
HCT VFR BLD CALC: 45.5 %
HDLC SERPL-MCNC: 45 MG/DL
HGB BLD-MCNC: 14.7 G/DL
HYALINE CASTS: 0 /LPF
IMM GRANULOCYTES NFR BLD AUTO: 3.2 %
KETONES URINE: NEGATIVE
LDLC SERPL CALC-MCNC: 132 MG/DL
LEUKOCYTE ESTERASE URINE: NEGATIVE
LYMPHOCYTES # BLD AUTO: 1.45 K/UL
LYMPHOCYTES NFR BLD AUTO: 21 %
MAN DIFF?: NORMAL
MCHC RBC-ENTMCNC: 29.5 PG
MCHC RBC-ENTMCNC: 32.3 GM/DL
MCV RBC AUTO: 91.2 FL
MICROSCOPIC-UA: NORMAL
MONOCYTES # BLD AUTO: 0.46 K/UL
MONOCYTES NFR BLD AUTO: 6.7 %
NEUTROPHILS # BLD AUTO: 4.5 K/UL
NEUTROPHILS NFR BLD AUTO: 65 %
NITRITE URINE: NEGATIVE
NONHDLC SERPL-MCNC: 167 MG/DL
PH URINE: 6.5
PLATELET # BLD AUTO: 221 K/UL
POTASSIUM SERPL-SCNC: 4.2 MMOL/L
PROT SERPL-MCNC: 6.4 G/DL
PROTEIN URINE: NEGATIVE
RBC # BLD: 4.99 M/UL
RBC # FLD: 12.7 %
RED BLOOD CELLS URINE: 1 /HPF
SODIUM SERPL-SCNC: 142 MMOL/L
SPECIFIC GRAVITY URINE: 1.02
SQUAMOUS EPITHELIAL CELLS: 0 /HPF
T4 FREE SERPL-MCNC: 1.4 NG/DL
TRIGL SERPL-MCNC: 177 MG/DL
TSH SERPL-ACNC: 1.99 UIU/ML
UROBILINOGEN URINE: NORMAL
VIT B12 SERPL-MCNC: 444 PG/ML
WBC # FLD AUTO: 6.91 K/UL
WHITE BLOOD CELLS URINE: 0 /HPF

## 2021-03-30 ENCOUNTER — APPOINTMENT (OUTPATIENT)
Dept: GASTROENTEROLOGY | Facility: CLINIC | Age: 71
End: 2021-03-30
Payer: MEDICARE

## 2021-03-30 VITALS
WEIGHT: 155 LBS | OXYGEN SATURATION: 98 % | DIASTOLIC BLOOD PRESSURE: 73 MMHG | BODY MASS INDEX: 22.19 KG/M2 | SYSTOLIC BLOOD PRESSURE: 135 MMHG | HEART RATE: 73 BPM | HEIGHT: 70 IN | TEMPERATURE: 98.1 F

## 2021-03-30 PROCEDURE — 36415 COLL VENOUS BLD VENIPUNCTURE: CPT

## 2021-03-30 PROCEDURE — 99204 OFFICE O/P NEW MOD 45 MIN: CPT | Mod: 25

## 2021-03-30 NOTE — PHYSICAL EXAM
[General Appearance - Alert] : alert [General Appearance - In No Acute Distress] : in no acute distress [General Appearance - Well Nourished] : well nourished [General Appearance - Well Developed] : well developed [General Appearance - Well-Appearing] : healthy appearing [Sclera] : the sclera and conjunctiva were normal [PERRL With Normal Accommodation] : pupils were equal in size, round, and reactive to light [Extraocular Movements] : extraocular movements were intact [Outer Ear] : the ears and nose were normal in appearance [Both Tympanic Membranes Were Examined] : both tympanic membranes were normal [Oropharynx] : the oropharynx was normal [Neck Appearance] : the appearance of the neck was normal [Neck Cervical Mass (___cm)] : no neck mass was observed [Thyroid Diffuse Enlargement] : the thyroid was not enlarged [Jugular Venous Distention Increased] : there was no jugular-venous distention [Thyroid Nodule] : there were no palpable thyroid nodules [Respiration, Rhythm And Depth] : normal respiratory rhythm and effort [Auscultation Breath Sounds / Voice Sounds] : lungs were clear to auscultation bilaterally [Heart Rate And Rhythm] : heart rate was normal and rhythm regular [Heart Sounds] : normal S1 and S2 [Heart Sounds Gallop] : no gallops [Murmurs] : no murmurs [Heart Sounds Pericardial Friction Rub] : no pericardial rub [Full Pulse] : the pedal pulses are present [Edema] : there was no peripheral edema [Bowel Sounds] : normal bowel sounds [Abdomen Soft] : soft [Abdomen Tenderness] : non-tender [Abdomen Mass (___ Cm)] : no abdominal mass palpated [Cervical Lymph Nodes Enlarged Posterior Bilaterally] : posterior cervical [Cervical Lymph Nodes Enlarged Anterior Bilaterally] : anterior cervical [No CVA Tenderness] : no ~M costovertebral angle tenderness [No Spinal Tenderness] : no spinal tenderness [Abnormal Walk] : normal gait [Nail Clubbing] : no clubbing  or cyanosis of the fingernails [Musculoskeletal - Swelling] : no joint swelling seen [Motor Tone] : muscle strength and tone were normal [Skin Color & Pigmentation] : normal skin color and pigmentation [Skin Turgor] : normal skin turgor [] : no rash [No Focal Deficits] : no focal deficits [Oriented To Time, Place, And Person] : oriented to person, place, and time [Impaired Insight] : insight and judgment were intact [Affect] : the affect was normal

## 2021-04-01 LAB — UREA BREATH TEST QL: NEGATIVE

## 2021-04-04 NOTE — ASSESSMENT
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 Of note he has a history of achalasia in his mom. \par \par GERD, belching, issues with intermittent dysphagia to liquids: prior work up was 20+ yrs ago. Now with 3 mos of worsening symptoms. Differential is broad and includes GERD/esophagitis, achalasia, motility issue (i.e. gastric outlet obstruction). \par - H pylori breath test today\par - plan for x-ray barium esophagram \par - EGD for structural evaluation\par - referral for esophageal manometry \par \par Screening colonoscopy: \par - plan for colonoscopy at the time of the EGD\par - r/b/a/i of the procedure reviewed and patient understands and agrees to proceed\par - reviewed bowel preparation instructions\par - needs COVID-19 PCR within 48-72 hrs of the procedure\par - needs escort after the procedure\par \par Follow up post-procedure

## 2021-04-04 NOTE — HISTORY OF PRESENT ILLNESS
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 \par \par Patient reports he has had reflux for years. \par Feels like things get stuck in [facility]par He had manometry and work up done a long time ago (20 yrs ago), as well as some imaging studies at that time and they didn't find anything. \par He just lived with it and symptoms were intermittent, didn't seem as bad. He took antacids and it just seemed to come and go\par \par Over the past 3 mos symptoms have worsened. When he goes to sleep at night he has a lot of belching\par Now he is belching and sensation that stuff is "getting stuck". \par Can no longer drink water at night - because it literally feels like it won't go down. \par Will sometimes get pain in chest at night. not exertional. It feels visceral and on R side, lateral to sternum. \par \par Doesn't notice dysphagia with solids - eats steak, meats etc. Doesn't feel like stuff gets stuck. No early satiety. \par Even liquids are fine most of the time, but there are times that liquids seem to bother him. \par Mother had achalaisa. \par \par Takes a PPI once a week. Did take it regularly for a while. It may have helped for a little. \par Last 3 mos these are new worsening symptoms. \par \par New thing is chest pain, nighttime reflux. He has elevated head of the bed. \par \par Patient presents with worsening heartburn x last few months. Has had episodes of bad reflux that wake him out of sleep. \par Has a history of a small hiatal hernia. \par Intermittently takes omeprazole\par \par No odynophagia. No coughing or choking with swallowing. No signfiicant weight loss. No fevers, chills, nausea, vomiting. \par He tries not to lie down after eating. He also does not even drink water at night anymore.\par \par Sometimes feels like he needs to belch but "it's getting stuck". \par Mom has a history of achalasia. \par \par Negative esophageal manometry in the past, 20 yrs ago. \par \par He does have some R sided period chest discomfort. It is dull. He underwent an EKG recently with his PMD and was noted to have stable t wave changes in EKG. The thought is that this is atypical in nature and related to GERD. He was referred to cardiologist. \par \par Last colonoscopy was 11 yrs ago. No history of polyps, just diverticulosis. \par \par Prior EGD 20+ yrs ago \par Last colonoscopy 12/19/2013: Dr. Morrow\par \par PMH: \par BPH \par \par PSH: \par Mastoidectomy 2004 (he notes lots of ear surgeries that have worsened his ability to hear) \par cutoff cuff (2008)\par tympanoplasty (2014)\par hernia surgery (2015 (bilateral inguinal and femoral hernias and has mesh) \par \par ALL: morphine itching \par \par MEDS: \par finasteride \par afluzosine \par \par FH: \par father pancreatic cancer - was an alcoholic \par Mother had achalasia, dementia, alzheimers\par \par SH: \par drinks few times a week\par no smoking \par no illicit drug use \par

## 2021-04-09 ENCOUNTER — APPOINTMENT (OUTPATIENT)
Dept: DERMATOLOGY | Facility: CLINIC | Age: 71
End: 2021-04-09
Payer: MEDICARE

## 2021-04-09 DIAGNOSIS — B07.8 OTHER VIRAL WARTS: ICD-10-CM

## 2021-04-09 DIAGNOSIS — D22.9 MELANOCYTIC NEVI, UNSPECIFIED: ICD-10-CM

## 2021-04-09 PROCEDURE — 99204 OFFICE O/P NEW MOD 45 MIN: CPT | Mod: 25

## 2021-04-09 PROCEDURE — 17110 DESTRUCTION B9 LES UP TO 14: CPT

## 2021-04-14 ENCOUNTER — APPOINTMENT (OUTPATIENT)
Dept: HEART AND VASCULAR | Facility: CLINIC | Age: 71
End: 2021-04-14

## 2021-04-17 ENCOUNTER — LABORATORY RESULT (OUTPATIENT)
Age: 71
End: 2021-04-17

## 2021-04-20 ENCOUNTER — OUTPATIENT (OUTPATIENT)
Dept: OUTPATIENT SERVICES | Facility: HOSPITAL | Age: 71
LOS: 1 days | Discharge: HOME | End: 2021-04-20
Payer: MEDICARE

## 2021-04-20 ENCOUNTER — APPOINTMENT (OUTPATIENT)
Dept: GASTROENTEROLOGY | Facility: HOSPITAL | Age: 71
End: 2021-04-20

## 2021-04-20 PROCEDURE — 91010 ESOPHAGUS MOTILITY STUDY: CPT

## 2021-04-20 PROCEDURE — 91010 ESOPHAGUS MOTILITY STUDY: CPT | Mod: 26

## 2021-04-27 ENCOUNTER — LABORATORY RESULT (OUTPATIENT)
Age: 71
End: 2021-04-27

## 2021-04-28 ENCOUNTER — APPOINTMENT (OUTPATIENT)
Dept: DERMATOLOGY | Facility: CLINIC | Age: 71
End: 2021-04-28

## 2021-04-30 ENCOUNTER — RESULT REVIEW (OUTPATIENT)
Age: 71
End: 2021-04-30

## 2021-04-30 ENCOUNTER — OUTPATIENT (OUTPATIENT)
Dept: OUTPATIENT SERVICES | Facility: HOSPITAL | Age: 71
LOS: 1 days | Discharge: HOME | End: 2021-04-30
Payer: MEDICARE

## 2021-04-30 ENCOUNTER — APPOINTMENT (OUTPATIENT)
Dept: GASTROENTEROLOGY | Facility: HOSPITAL | Age: 71
End: 2021-04-30

## 2021-04-30 PROCEDURE — 82657 ENZYME CELL ACTIVITY: CPT

## 2021-04-30 PROCEDURE — 43235 EGD DIAGNOSTIC BRUSH WASH: CPT

## 2021-04-30 PROCEDURE — 88305 TISSUE EXAM BY PATHOLOGIST: CPT | Mod: 26

## 2021-04-30 PROCEDURE — 91034 GASTROESOPHAGEAL REFLUX TEST: CPT | Mod: 26

## 2021-04-30 PROCEDURE — 88305 TISSUE EXAM BY PATHOLOGIST: CPT

## 2021-04-30 PROCEDURE — 43239 EGD BIOPSY SINGLE/MULTIPLE: CPT

## 2021-04-30 PROCEDURE — C1889: CPT

## 2021-04-30 PROCEDURE — 45378 DIAGNOSTIC COLONOSCOPY: CPT

## 2021-04-30 PROCEDURE — 45380 COLONOSCOPY AND BIOPSY: CPT | Mod: PT

## 2021-05-04 LAB
B-GALACTOSIDASE TISS-CCNT: 236.1 U/G — SIGNIFICANT CHANGE UP
DISACCHARIDASES TSMI-IMP: SIGNIFICANT CHANGE UP
ISOMALTASE TISS-CCNT: 18.5 U/G — SIGNIFICANT CHANGE UP
PALATINASE TISS-CCNT: 64.8 U/G — SIGNIFICANT CHANGE UP
SUCRASE TISS-CCNT: 4.6 U/G — LOW

## 2021-05-05 LAB — SURGICAL PATHOLOGY STUDY: SIGNIFICANT CHANGE UP

## 2021-05-17 ENCOUNTER — OUTPATIENT (OUTPATIENT)
Dept: OUTPATIENT SERVICES | Facility: HOSPITAL | Age: 71
LOS: 1 days | End: 2021-05-17
Payer: MEDICARE

## 2021-05-17 ENCOUNTER — APPOINTMENT (OUTPATIENT)
Dept: RADIOLOGY | Facility: HOSPITAL | Age: 71
End: 2021-05-17
Payer: MEDICARE

## 2021-05-17 PROCEDURE — 74220 X-RAY XM ESOPHAGUS 1CNTRST: CPT

## 2021-05-17 PROCEDURE — 74220 X-RAY XM ESOPHAGUS 1CNTRST: CPT | Mod: 26

## 2021-05-19 ENCOUNTER — NON-APPOINTMENT (OUTPATIENT)
Age: 71
End: 2021-05-19

## 2021-05-20 ENCOUNTER — APPOINTMENT (OUTPATIENT)
Age: 71
End: 2021-05-20
Payer: MEDICARE

## 2021-05-20 DIAGNOSIS — R13.10 DYSPHAGIA, UNSPECIFIED: ICD-10-CM

## 2021-05-20 PROCEDURE — 99214 OFFICE O/P EST MOD 30 MIN: CPT | Mod: 95

## 2021-05-26 ENCOUNTER — TRANSCRIPTION ENCOUNTER (OUTPATIENT)
Age: 71
End: 2021-05-26

## 2021-07-12 ENCOUNTER — RESULT REVIEW (OUTPATIENT)
Age: 71
End: 2021-07-12

## 2021-07-12 ENCOUNTER — APPOINTMENT (OUTPATIENT)
Dept: ORTHOPEDIC SURGERY | Facility: CLINIC | Age: 71
End: 2021-07-12
Payer: MEDICARE

## 2021-07-12 ENCOUNTER — OUTPATIENT (OUTPATIENT)
Dept: OUTPATIENT SERVICES | Facility: HOSPITAL | Age: 71
LOS: 1 days | End: 2021-07-12
Payer: MEDICARE

## 2021-07-12 VITALS — BODY MASS INDEX: 22.19 KG/M2 | WEIGHT: 155 LBS | RESPIRATION RATE: 16 BRPM | HEIGHT: 70 IN

## 2021-07-12 DIAGNOSIS — M77.11 LATERAL EPICONDYLITIS, RIGHT ELBOW: ICD-10-CM

## 2021-07-12 PROCEDURE — 73080 X-RAY EXAM OF ELBOW: CPT

## 2021-07-12 PROCEDURE — 99204 OFFICE O/P NEW MOD 45 MIN: CPT

## 2021-07-12 PROCEDURE — 73080 X-RAY EXAM OF ELBOW: CPT | Mod: 26,RT

## 2021-07-16 ENCOUNTER — APPOINTMENT (OUTPATIENT)
Dept: DERMATOLOGY | Facility: CLINIC | Age: 71
End: 2021-07-16

## 2021-07-23 ENCOUNTER — LABORATORY RESULT (OUTPATIENT)
Age: 71
End: 2021-07-23

## 2021-07-23 ENCOUNTER — APPOINTMENT (OUTPATIENT)
Dept: UROLOGY | Facility: CLINIC | Age: 71
End: 2021-07-23
Payer: MEDICARE

## 2021-07-23 PROCEDURE — 51798 US URINE CAPACITY MEASURE: CPT

## 2021-07-23 PROCEDURE — 99214 OFFICE O/P EST MOD 30 MIN: CPT

## 2021-07-23 PROCEDURE — 51741 ELECTRO-UROFLOWMETRY FIRST: CPT

## 2021-07-23 NOTE — PHYSICAL EXAM
[Normal Appearance] : normal appearance [Well Groomed] : well groomed [Urethral Meatus] : meatus normal [Penis Abnormality] : normal circumcised penis [Epididymis] : the epididymides were normal [Testes Tenderness] : no tenderness of the testes [Prostate Tenderness] : the prostate was not tender [No Prostate Nodules] : no prostate nodules [FreeTextEntry1] : ; then voided again PVR 28 prostate assymetry R>L;smooth; mild left caput epididymal tenderness

## 2021-07-23 NOTE — ASSESSMENT
[FreeTextEntry1] : Urinary symptoms improved on Uroxatral and Finasteride\par Patient however complains of the effect on sexual function.  He states that he is able to get an erection and have intercourse however he has no sense of enjoyment due to the poor quality of the ejaculate on Uroxatral and finasteride.  We discussed proceeding with intervention such as UROLIFT and attempt to stop medications.  He is reluctant to do this.  We discussed the UROLIFT procedure.\par \par We discussed the utilization of Cialis 5 mg daily in attempt to treat his urinary and sexual dysfunction.  He wished to proceed with this at this time.\par \par We discussed proceeding with a renal and bladder ultrasound.\par \par His PSA data was reviewed.  He has had a reduction in PSA on finasteride.  He is status post biopsy.  He is status post MRIs and a 4K.  We will repeat his PSA at this point.  His examination is unremarkable.\par \par Elevated PSA \par .s/p TUSGbx\par s/p MRI x2\par s/p 4K\par Will repeat PSA\par discussed  repeating PSA and or MRI\par continue to Monitor\par ,\par 1.8.2021\par labs reviewed\par PSA stable after finasteride\par s/p MRI \par s/p biopsy\par EXAM CHANGED with assymetry of (R lobe greated than left)  recommend repeat MRI\par The ISH MATAMOROS  expressed fully understanding of the information provided, the consequences and the management.\par \par LUTs \par low flow and PVR (flow nondiagnostic\par on maximal management\par interested in UROLIFT after COVID crisis\par patient has been given Informational materials given to patient.\par \par \par sexual function\par responds to to PD5 with excellent erection\par however not willing to take PD5\par couple is not interested in addressing\par \par \par 7.23.2021\par LUTS intiial ; flow 8 cc /sec voided volume 88\par then voided to PVR 29\par reviewed UROLIFT\par discussed indication\par will attempt tadalafil 5 mg daily\par stop alfuzosin\par \par erectile dysfunction\par not willing to take PD5 prn\par discussed daily tadalafil for LUTS and ED\par will repeat endocrine\par \par PSA will repeat\par \par testicular discomfort\par epididymal tenderness\par We discussed conservative management with: a.  nonsteroidal anti-inflammatories, b. ice prn and c. scrotal support.\par \par \par \par \par

## 2021-07-23 NOTE — HISTORY OF PRESENT ILLNESS
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 He notes marked improvement in urinary urgency. He still notes decreased urinary stream.\par He feels the libido has diminished since on finasteride; although, acknowledges diminished libido prior to starting. \par \par 8.6.2018\par MRI no evidence of areas concern\par 4K low risk\par complaining of a penile curvature for the last six months\par ? worsened recently does not preclude SI\par not painful\par libido has diminished; had preceded the implementation of finasteride\par low ejaculate volume since alfuzosin\par continues to be sexually active but less frequent\par Urinary symptoms improved on finasteride \par \par Patient is currently experiencing weak stream, but no urinary incontinence and no urinary urgency  \par The patient presents with complaints of nocturia (once (marked improvement)). \par The patient presents with complaints of erectile dysfunction (able to achieve intercourse; erections 7/10. curvature 30 %; ? change over last 6 months) \par  \par 1.25.2019\par patient returns for DUS\par he states that penile curvature preclude sexual intercourse\par he states that at the point of curvature there is penile instability\par he has avoided sexual intercourse\par \par \par 1.31.2020\par sexual dysfunction takes viagra\par it works but "annoying" to have to take medication\par low ejaculate volume on alfuzosin\par "sex less pleasurable"\par \par urinary symptoms; IPSS 13\par slow stream in AM before takes medication\par during day does well\par urinary retention after ear surgery\par \par 1.8.2021\par patient not having sexual intercourse \par "agreement" \par did not enjoy having taking medication\par \par IPSS 10\par ? dribbling\par on afluzosin and finasteride\par \par 7.23.2021\par complaining of mild left testicular discomfor tx 3 weeks\par mild gnawing\par no swelling erythema etc\par occasional masturbation\par no sexual activity\par poor quality erection not sufficient for sexual intercourse\par continues on alfuzosin and finasteride\par

## 2021-07-24 ENCOUNTER — TRANSCRIPTION ENCOUNTER (OUTPATIENT)
Age: 71
End: 2021-07-24

## 2021-07-27 ENCOUNTER — TRANSCRIPTION ENCOUNTER (OUTPATIENT)
Age: 71
End: 2021-07-27

## 2021-08-16 ENCOUNTER — APPOINTMENT (OUTPATIENT)
Dept: GASTROENTEROLOGY | Facility: CLINIC | Age: 71
End: 2021-08-16
Payer: MEDICARE

## 2021-08-16 VITALS
TEMPERATURE: 99.1 F | HEART RATE: 54 BPM | HEIGHT: 70 IN | WEIGHT: 157 LBS | OXYGEN SATURATION: 97 % | SYSTOLIC BLOOD PRESSURE: 132 MMHG | DIASTOLIC BLOOD PRESSURE: 69 MMHG | BODY MASS INDEX: 22.48 KG/M2

## 2021-08-16 DIAGNOSIS — Z00.00 ENCOUNTER FOR GENERAL ADULT MEDICAL EXAMINATION W/OUT ABNORMAL FINDINGS: ICD-10-CM

## 2021-08-16 PROCEDURE — 99214 OFFICE O/P EST MOD 30 MIN: CPT

## 2021-08-16 RX ORDER — OMEPRAZOLE 20 MG/1
20 TABLET, DELAYED RELEASE ORAL DAILY
Qty: 30 | Refills: 5 | Status: ACTIVE | COMMUNITY
Start: 2021-08-16 | End: 1900-01-01

## 2021-08-18 LAB
ALBUMIN SERPL ELPH-MCNC: 4.3 G/DL
ALP BLD-CCNC: 104 U/L
ALT SERPL-CCNC: 14 U/L
ANION GAP SERPL CALC-SCNC: 12 MMOL/L
AST SERPL-CCNC: 25 U/L
BASOPHILS # BLD AUTO: 0.03 K/UL
BASOPHILS NFR BLD AUTO: 0.4 %
BILIRUB SERPL-MCNC: 0.5 MG/DL
BUN SERPL-MCNC: 14 MG/DL
CALCIUM SERPL-MCNC: 9.2 MG/DL
CHLORIDE SERPL-SCNC: 106 MMOL/L
CHOLEST SERPL-MCNC: 189 MG/DL
CO2 SERPL-SCNC: 25 MMOL/L
CREAT SERPL-MCNC: 1.13 MG/DL
EOSINOPHIL # BLD AUTO: 0.22 K/UL
EOSINOPHIL NFR BLD AUTO: 3.2 %
GLUCOSE SERPL-MCNC: 93 MG/DL
HCT VFR BLD CALC: 43 %
HDLC SERPL-MCNC: 36 MG/DL
HGB BLD-MCNC: 14.5 G/DL
IMM GRANULOCYTES NFR BLD AUTO: 0.3 %
LDLC SERPL CALC-MCNC: 127 MG/DL
LYMPHOCYTES # BLD AUTO: 1.56 K/UL
LYMPHOCYTES NFR BLD AUTO: 22.5 %
MAN DIFF?: NORMAL
MCHC RBC-ENTMCNC: 30.6 PG
MCHC RBC-ENTMCNC: 33.7 GM/DL
MCV RBC AUTO: 90.7 FL
MONOCYTES # BLD AUTO: 0.41 K/UL
MONOCYTES NFR BLD AUTO: 5.9 %
NEUTROPHILS # BLD AUTO: 4.7 K/UL
NEUTROPHILS NFR BLD AUTO: 67.7 %
NONHDLC SERPL-MCNC: 154 MG/DL
PLATELET # BLD AUTO: 248 K/UL
POTASSIUM SERPL-SCNC: 4.6 MMOL/L
PROT SERPL-MCNC: 6.3 G/DL
RBC # BLD: 4.74 M/UL
RBC # FLD: 12.6 %
SODIUM SERPL-SCNC: 143 MMOL/L
TRIGL SERPL-MCNC: 132 MG/DL
WBC # FLD AUTO: 6.94 K/UL

## 2022-02-01 ENCOUNTER — APPOINTMENT (OUTPATIENT)
Dept: UROLOGY | Facility: CLINIC | Age: 72
End: 2022-02-01
Payer: MEDICARE

## 2022-02-01 VITALS
HEART RATE: 85 BPM | DIASTOLIC BLOOD PRESSURE: 98 MMHG | SYSTOLIC BLOOD PRESSURE: 166 MMHG | BODY MASS INDEX: 22.96 KG/M2 | WEIGHT: 160 LBS | TEMPERATURE: 97.9 F

## 2022-02-01 VITALS — SYSTOLIC BLOOD PRESSURE: 171 MMHG | DIASTOLIC BLOOD PRESSURE: 80 MMHG

## 2022-02-01 DIAGNOSIS — N50.812 LEFT TESTICULAR PAIN: ICD-10-CM

## 2022-02-01 PROCEDURE — 99214 OFFICE O/P EST MOD 30 MIN: CPT

## 2022-02-01 PROCEDURE — 51741 ELECTRO-UROFLOWMETRY FIRST: CPT

## 2022-02-01 PROCEDURE — 51798 US URINE CAPACITY MEASURE: CPT

## 2022-02-01 PROCEDURE — 76870 US EXAM SCROTUM: CPT

## 2022-02-01 NOTE — HISTORY OF PRESENT ILLNESS
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 He notes marked improvement in urinary urgency. He still notes decreased urinary stream.\par He feels the libido has diminished since on finasteride; although, acknowledges diminished libido prior to starting. \par \par 8.6.2018\par MRI no evidence of areas concern\par 4K low risk\par complaining of a penile curvature for the last six months\par ? worsened recently does not preclude SI\par not painful\par libido has diminished; had preceded the implementation of finasteride\par low ejaculate volume since alfuzosin\par continues to be sexually active but less frequent\par Urinary symptoms improved on finasteride \par \par Patient is currently experiencing weak stream, but no urinary incontinence and no urinary urgency  \par The patient presents with complaints of nocturia (once (marked improvement)). \par The patient presents with complaints of erectile dysfunction (able to achieve intercourse; erections 7/10. curvature 30 %; ? change over last 6 months) \par  \par 1.25.2019\par patient returns for DUS\par he states that penile curvature preclude sexual intercourse\par he states that at the point of curvature there is penile instability\par he has avoided sexual intercourse\par \par \par 1.31.2020\par sexual dysfunction takes viagra\par it works but "annoying" to have to take medication\par low ejaculate volume on alfuzosin\par "sex less pleasurable"\par \par urinary symptoms; IPSS 13\par slow stream in AM before takes medication\par during day does well\par urinary retention after ear surgery\par \par 1.8.2021\par patient not having sexual intercourse \par "agreement" \par did not enjoy having taking medication\par \par IPSS 10\par ? dribbling\par on afluzosin and finasteride\par \par 7.23.2021\par complaining of mild left testicular discomfor tx 3 weeks\par mild gnawing\par no swelling erythema etc\par occasional masturbation\par no sexual activity\par poor quality erection not sufficient for sexual intercourse\par continues on alfuzosin and finasteride\par \par 2.1.2022\par testicular discomfort persists\par sexuall function\par penetrates with poor erection; did not respond to daily cialis\par started sildenafil 40 mg did not improve\par experience premature ejaculation due to anxiety relating to performance\par decreased flow on finasteride and alfuzosin

## 2022-02-01 NOTE — LETTER BODY
[FreeTextEntry2] : RICH Parker [FreeTextEntry1] :  \par Dear Doctor,\par \par \par I had the opportunity to see your patient, Mr. ISH MATAMOROS in followup. I am enclosing my office note for your information.\par \par I will keep you informed of any developments.\par \par Feel free to contact me if you have any questions.\par \par Sincerely,\par \par William Jiménez MD, FACS\par Professor of Urology\par [facility]\par \par [address]\par [place], [place] [address]\par \par [address]\par [place], [place] [address]\par \par Office Telephone \par [phone]\par \par Fax\par [phone]\par

## 2022-02-01 NOTE — ASSESSMENT
[FreeTextEntry1] : Urinary symptoms improved on Uroxatral and Finasteride\par Patient however complains of the effect on sexual function.  He states that he is able to get an erection and have intercourse however he has no sense of enjoyment due to the poor quality of the ejaculate on Uroxatral and finasteride.  We discussed proceeding with intervention such as UROLIFT and attempt to stop medications.  He is reluctant to do this.  We discussed the UROLIFT procedure.\par \par We discussed the utilization of Cialis 5 mg daily in attempt to treat his urinary and sexual dysfunction.  He wished to proceed with this at this time.\par \par We discussed proceeding with a renal and bladder ultrasound.\par \par His PSA data was reviewed.  He has had a reduction in PSA on finasteride.  He is status post biopsy.  He is status post MRIs and a 4K.  We will repeat his PSA at this point.  His examination is unremarkable.\par \par Elevated PSA \par .s/p TUSGbx\par s/p MRI x2\par s/p 4K\par Will repeat PSA\par discussed  repeating PSA and or MRI\par continue to Monitor\par ,\par 1.8.2021\par labs reviewed\par PSA stable after finasteride\par s/p MRI \par s/p biopsy\par EXAM CHANGED with assymetry of (R lobe greated than left)  recommend repeat MRI\par The ISH MATAMOROS  expressed fully understanding of the information provided, the consequences and the management.\par \par LUTs \par low flow and PVR (flow nondiagnostic\par on maximal management\par interested in UROLIFT after COVID crisis\par patient has been given Informational materials given to patient.\par \par \par sexual function\par responds to to PD5 with excellent erection\par however not willing to take PD5\par couple is not interested in addressing\par \par \par 7.23.2021\par LUTS intiial ; flow 8 cc /sec voided volume 88\par then voided to PVR 29\par reviewed UROLIFT\par discussed indication\par will attempt tadalafil 5 mg daily\par stop alfuzosin\par \par erectile dysfunction\par not willing to take PD5 prn\par discussed daily tadalafil for LUTS and ED\par will repeat endocrine\par \par PSA will repeat\par \par testicular discomfort\par epididymal tenderness\par We discussed conservative management with: a.  nonsteroidal anti-inflammatories, b. ice prn and c. scrotal support.\par \par \par 2/1/2022\par \par Patient returns with urinary symptoms.  His flow is low however the volume is nondiagnostic.  He has a small postvoid residual.  He complains of some urgency.  We previously discussed proceeding with a UroLift procedure.  He is reluctant to proceed with any intervention.  He is not happy with the retreatment rate reported for UroLift.  We discussed Rezum.  Previously we had discussed utilizing Vesicare.  Apparently he did not proceed with this.  We discussed attempting Myrbetriq however he was hypertensive and has had episodes of elevated blood pressure.  We will initiate therapy with Ditropan XL 5 mg.  We discussed the risk of urinary retention and side effects.\par Eliminate caffeinated products\par 1 coffee and several cola's per day\par \par He discussed complains of left testicular discomfort.  It is not severe.  It is a gnawing feeling.  There is no hernia.  His examination is unremarkable.  His left epididymis is slightly tender.  But this is within normal limits.  We discussed nonsteroidal anti-inflammatories.  We discussed the need to avoid self examination.  We will proceed with a scrotal ultrasound\par \par His sexual dysfunction continues to be a problem.  He is increased the dose of sildenafil to 40 mg.  He is not does progressed beyond this.  He states he is able to have intercourse with a soft penis.  He experiences premature ejaculation due to his frustration.  He states that he is avoiding sexual activity and feels this is sad.  We discussed dose escalation.\par \par He has an abnormal prostate exam.  He will undergo a repeat PSA.  He is status post MRI and biopsy.\par \par Plan\par .1 creatinine, PSA\par 2. urinalysis\par 3. conservative management\par 4. ditropan Xl 5 mg\par 5. fu  4 weeks

## 2022-02-01 NOTE — PHYSICAL EXAM
[Urethral Meatus] : meatus normal [Penis Abnormality] : normal circumcised penis [Epididymis] : the epididymides were normal [Testes Tenderness] : no tenderness of the testes [Prostate Tenderness] : the prostate was not tender [No Prostate Nodules] : no prostate nodules [FreeTextEntry1] :  prostate assymetry R>L;smooth; mild left caput epididymal tenderness; PVR 21; flow 6.6cc/sec volume 73.2

## 2022-02-02 ENCOUNTER — TRANSCRIPTION ENCOUNTER (OUTPATIENT)
Age: 72
End: 2022-02-02

## 2022-02-02 LAB
APPEARANCE: CLEAR
BACTERIA: NEGATIVE
BILIRUBIN URINE: NEGATIVE
BLOOD URINE: NEGATIVE
COLOR: YELLOW
CREAT SERPL-MCNC: 1.01 MG/DL
GLUCOSE QUALITATIVE U: NEGATIVE
HYALINE CASTS: 0 /LPF
KETONES URINE: NEGATIVE
LEUKOCYTE ESTERASE URINE: NEGATIVE
MICROSCOPIC-UA: NORMAL
NITRITE URINE: NEGATIVE
PH URINE: 6
PROTEIN URINE: NORMAL
PSA FREE FLD-MCNC: 24 %
PSA FREE SERPL-MCNC: 1 NG/ML
PSA SERPL-MCNC: 4.12 NG/ML
RED BLOOD CELLS URINE: 4 /HPF
SPECIFIC GRAVITY URINE: 1.02
SQUAMOUS EPITHELIAL CELLS: 0 /HPF
UROBILINOGEN URINE: NORMAL
WHITE BLOOD CELLS URINE: 1 /HPF

## 2022-02-07 ENCOUNTER — TRANSCRIPTION ENCOUNTER (OUTPATIENT)
Age: 72
End: 2022-02-07

## 2022-02-10 ENCOUNTER — TRANSCRIPTION ENCOUNTER (OUTPATIENT)
Age: 72
End: 2022-02-10

## 2022-02-14 ENCOUNTER — NON-APPOINTMENT (OUTPATIENT)
Age: 72
End: 2022-02-14

## 2022-02-14 DIAGNOSIS — R74.8 ABNORMAL LEVELS OF OTHER SERUM ENZYMES: ICD-10-CM

## 2022-02-14 LAB
25(OH)D3 SERPL-MCNC: 38.2 NG/ML
ALBUMIN SERPL ELPH-MCNC: 4.9 G/DL
ALP BLD-CCNC: 124 U/L
ALT SERPL-CCNC: 29 U/L
ANION GAP SERPL CALC-SCNC: 20 MMOL/L
APPEARANCE: CLEAR
AST SERPL-CCNC: 28 U/L
BACTERIA: NEGATIVE
BASOPHILS # BLD AUTO: 0.03 K/UL
BASOPHILS NFR BLD AUTO: 0.4 %
BILIRUB SERPL-MCNC: 0.4 MG/DL
BILIRUBIN URINE: NEGATIVE
BLOOD URINE: NEGATIVE
BUN SERPL-MCNC: 18 MG/DL
CALCIUM SERPL-MCNC: 10 MG/DL
CHLORIDE SERPL-SCNC: 101 MMOL/L
CHOLEST SERPL-MCNC: 210 MG/DL
CO2 SERPL-SCNC: 22 MMOL/L
COLOR: NORMAL
CREAT SERPL-MCNC: 1.07 MG/DL
EOSINOPHIL # BLD AUTO: 0.29 K/UL
EOSINOPHIL NFR BLD AUTO: 3.9 %
ESTIMATED AVERAGE GLUCOSE: 108 MG/DL
FOLATE SERPL-MCNC: 16.6 NG/ML
GLUCOSE QUALITATIVE U: NEGATIVE
GLUCOSE SERPL-MCNC: 91 MG/DL
HBA1C MFR BLD HPLC: 5.4 %
HCT VFR BLD CALC: 49.1 %
HDLC SERPL-MCNC: 34 MG/DL
HGB BLD-MCNC: 15.7 G/DL
HYALINE CASTS: 0 /LPF
IMM GRANULOCYTES NFR BLD AUTO: 0.3 %
KETONES URINE: NEGATIVE
LDLC SERPL CALC-MCNC: 125 MG/DL
LEUKOCYTE ESTERASE URINE: NEGATIVE
LYMPHOCYTES # BLD AUTO: 1.91 K/UL
LYMPHOCYTES NFR BLD AUTO: 25.8 %
MAN DIFF?: NORMAL
MCHC RBC-ENTMCNC: 30.1 PG
MCHC RBC-ENTMCNC: 32 GM/DL
MCV RBC AUTO: 94.1 FL
MICROSCOPIC-UA: NORMAL
MONOCYTES # BLD AUTO: 0.55 K/UL
MONOCYTES NFR BLD AUTO: 7.4 %
NEUTROPHILS # BLD AUTO: 4.59 K/UL
NEUTROPHILS NFR BLD AUTO: 62.2 %
NITRITE URINE: NEGATIVE
NONHDLC SERPL-MCNC: 176 MG/DL
PH URINE: 5.5
PLATELET # BLD AUTO: 266 K/UL
POTASSIUM SERPL-SCNC: 4.5 MMOL/L
PROT SERPL-MCNC: 7 G/DL
PROTEIN URINE: NEGATIVE
PSA SERPL-MCNC: 3.89 NG/ML
RBC # BLD: 5.22 M/UL
RBC # FLD: 13.1 %
RED BLOOD CELLS URINE: 1 /HPF
SODIUM SERPL-SCNC: 142 MMOL/L
SPECIFIC GRAVITY URINE: 1.02
SQUAMOUS EPITHELIAL CELLS: 0 /HPF
T4 FREE SERPL-MCNC: 1.4 NG/DL
TRIGL SERPL-MCNC: 257 MG/DL
TSH SERPL-ACNC: 3.79 UIU/ML
UROBILINOGEN URINE: NORMAL
VIT B12 SERPL-MCNC: 442 PG/ML
WBC # FLD AUTO: 7.39 K/UL
WHITE BLOOD CELLS URINE: 0 /HPF

## 2022-03-03 ENCOUNTER — APPOINTMENT (OUTPATIENT)
Dept: FAMILY MEDICINE | Facility: CLINIC | Age: 72
End: 2022-03-03
Payer: MEDICARE

## 2022-03-03 ENCOUNTER — NON-APPOINTMENT (OUTPATIENT)
Age: 72
End: 2022-03-03

## 2022-03-03 VITALS
SYSTOLIC BLOOD PRESSURE: 126 MMHG | BODY MASS INDEX: 23.48 KG/M2 | OXYGEN SATURATION: 99 % | TEMPERATURE: 97.8 F | DIASTOLIC BLOOD PRESSURE: 67 MMHG | HEIGHT: 70 IN | WEIGHT: 164 LBS | HEART RATE: 69 BPM

## 2022-03-03 DIAGNOSIS — K21.9 GASTRO-ESOPHAGEAL REFLUX DISEASE W/OUT ESOPHAGITIS: ICD-10-CM

## 2022-03-03 PROCEDURE — 93000 ELECTROCARDIOGRAM COMPLETE: CPT

## 2022-03-03 PROCEDURE — 99214 OFFICE O/P EST MOD 30 MIN: CPT | Mod: 25

## 2022-03-03 RX ORDER — OMEGA-3/DHA/EPA/FISH OIL 300-1000MG
1000 CAPSULE ORAL
Qty: 90 | Refills: 3 | Status: ACTIVE | COMMUNITY
Start: 2022-03-03 | End: 1900-01-01

## 2022-03-03 NOTE — HISTORY OF PRESENT ILLNESS
[de-identified] : 70 yo male here for follow up.\par \par H/o HLD and high triglycerides. Hasn't been exercising much. However has been working on dietary modification from August onward. Lipids still high in February. ASCVD 24%.\par \par Still following with Uro for BPH. PSA has been stable. Has been on finasteride with some ED side effects. Has tried Cialis but makes urinary symptoms worse. \par \par Patient suffers from chronic heartburn which is controlled with PPI. Has had negative esophageal manometry last year. H/o small hiatal hernia. Had colonoscopy and endoscopy as well last year. Had some CP at last visit, but that has since resolved with addressing GI issues.

## 2022-03-06 ENCOUNTER — NON-APPOINTMENT (OUTPATIENT)
Age: 72
End: 2022-03-06

## 2022-03-07 ENCOUNTER — NON-APPOINTMENT (OUTPATIENT)
Age: 72
End: 2022-03-07

## 2022-03-07 LAB
ALBUMIN SERPL ELPH-MCNC: 4.7 G/DL
ALP BLD-CCNC: 110 U/L
ALT SERPL-CCNC: 29 U/L
ANION GAP SERPL CALC-SCNC: 12 MMOL/L
AST SERPL-CCNC: 25 U/L
BILIRUB SERPL-MCNC: 0.3 MG/DL
BUN SERPL-MCNC: 14 MG/DL
CALCIUM SERPL-MCNC: 9.4 MG/DL
CHLORIDE SERPL-SCNC: 104 MMOL/L
CHOLEST SERPL-MCNC: 226 MG/DL
CO2 SERPL-SCNC: 27 MMOL/L
CREAT SERPL-MCNC: 1.06 MG/DL
EGFR: 74 ML/MIN/1.73M2
GLUCOSE SERPL-MCNC: 101 MG/DL
HDLC SERPL-MCNC: 38 MG/DL
LDLC SERPL CALC-MCNC: 146 MG/DL
NONHDLC SERPL-MCNC: 188 MG/DL
POTASSIUM SERPL-SCNC: 4.5 MMOL/L
PROT SERPL-MCNC: 6.7 G/DL
SODIUM SERPL-SCNC: 144 MMOL/L
TRIGL SERPL-MCNC: 213 MG/DL

## 2022-03-13 ENCOUNTER — NON-APPOINTMENT (OUTPATIENT)
Age: 72
End: 2022-03-13

## 2022-03-18 ENCOUNTER — APPOINTMENT (OUTPATIENT)
Dept: UROLOGY | Facility: CLINIC | Age: 72
End: 2022-03-18

## 2022-04-19 ENCOUNTER — NON-APPOINTMENT (OUTPATIENT)
Age: 72
End: 2022-04-19

## 2022-04-19 LAB
ALBUMIN SERPL ELPH-MCNC: 4.7 G/DL
ALP BLD-CCNC: 107 U/L
ALT SERPL-CCNC: 20 U/L
ANION GAP SERPL CALC-SCNC: 12 MMOL/L
AST SERPL-CCNC: 19 U/L
BILIRUB SERPL-MCNC: 0.5 MG/DL
BUN SERPL-MCNC: 14 MG/DL
CALCIUM SERPL-MCNC: 9.3 MG/DL
CHLORIDE SERPL-SCNC: 107 MMOL/L
CHOLEST SERPL-MCNC: 172 MG/DL
CO2 SERPL-SCNC: 27 MMOL/L
CREAT SERPL-MCNC: 1.05 MG/DL
EGFR: 75 ML/MIN/1.73M2
GLUCOSE SERPL-MCNC: 108 MG/DL
HDLC SERPL-MCNC: 36 MG/DL
LDLC SERPL CALC-MCNC: 108 MG/DL
NONHDLC SERPL-MCNC: 136 MG/DL
POTASSIUM SERPL-SCNC: 4.4 MMOL/L
PROT SERPL-MCNC: 6.6 G/DL
SODIUM SERPL-SCNC: 146 MMOL/L
TRIGL SERPL-MCNC: 142 MG/DL

## 2022-04-21 ENCOUNTER — APPOINTMENT (OUTPATIENT)
Dept: FAMILY MEDICINE | Facility: CLINIC | Age: 72
End: 2022-04-21
Payer: MEDICARE

## 2022-04-21 VITALS
TEMPERATURE: 97.6 F | HEART RATE: 54 BPM | SYSTOLIC BLOOD PRESSURE: 130 MMHG | OXYGEN SATURATION: 98 % | DIASTOLIC BLOOD PRESSURE: 77 MMHG | WEIGHT: 166 LBS | HEIGHT: 70 IN | BODY MASS INDEX: 23.77 KG/M2

## 2022-04-21 DIAGNOSIS — Z00.00 ENCOUNTER FOR GENERAL ADULT MEDICAL EXAMINATION W/OUT ABNORMAL FINDINGS: ICD-10-CM

## 2022-04-21 PROCEDURE — 36415 COLL VENOUS BLD VENIPUNCTURE: CPT

## 2022-04-21 PROCEDURE — G0439: CPT

## 2022-04-21 RX ORDER — SILDENAFIL 20 MG/1
20 TABLET ORAL
Qty: 30 | Refills: 1 | Status: DISCONTINUED | COMMUNITY
Start: 2019-01-25 | End: 2022-04-21

## 2022-04-21 RX ORDER — OXYBUTYNIN CHLORIDE 5 MG/1
5 TABLET, EXTENDED RELEASE ORAL
Qty: 90 | Refills: 0 | Status: DISCONTINUED | COMMUNITY
Start: 2022-02-01 | End: 2022-04-21

## 2022-04-21 RX ORDER — TADALAFIL 5 MG/1
5 TABLET ORAL
Qty: 30 | Refills: 2 | Status: DISCONTINUED | COMMUNITY
Start: 2020-01-31 | End: 2022-04-21

## 2022-04-21 RX ORDER — OMEPRAZOLE 40 MG/1
40 CAPSULE, DELAYED RELEASE ORAL
Refills: 0 | Status: DISCONTINUED | COMMUNITY
End: 2022-04-21

## 2022-04-21 RX ORDER — OMEPRAZOLE 40 MG/1
40 CAPSULE, DELAYED RELEASE ORAL
Qty: 30 | Refills: 3 | Status: DISCONTINUED | COMMUNITY
Start: 2021-05-20 | End: 2022-04-21

## 2022-04-21 NOTE — ASSESSMENT
[FreeTextEntry1] : CPE- Well exam, comprehensive labs ordered. EKG NSR, reviewed from last month. Check repeat CMP for elevated sodium. Colonoscopy up to date. Follow up repeat labs.

## 2022-04-21 NOTE — HEALTH RISK ASSESSMENT
[Good] : ~his/her~  mood as  good [No falls in past year] : Patient reported no falls in the past year [0] : 2) Feeling down, depressed, or hopeless: Not at all (0) [PHQ-2 Negative - No further assessment needed] : PHQ-2 Negative - No further assessment needed [HIV test declined] : HIV test declined [Hepatitis C test declined] : Hepatitis C test declined [With Significant Other] : lives with significant other [Retired] : retired [Significant Other] : lives with significant other [Fully functional (bathing, dressing, toileting, transferring, walking, feeding)] : Fully functional (bathing, dressing, toileting, transferring, walking, feeding) [Fully functional (using the telephone, shopping, preparing meals, housekeeping, doing laundry, using] : Fully functional and needs no help or supervision to perform IADLs (using the telephone, shopping, preparing meals, housekeeping, doing laundry, using transportation, managing medications and managing finances) [[id number]] : 0 [Reports changes in hearing] : Reports no changes in hearing [Reports changes in vision] : Reports no changes in vision

## 2022-04-21 NOTE — HISTORY OF PRESENT ILLNESS
[de-identified] : 70 yo male here for CPE. Feeling well today, no complaints. \par \par Recently started on statin for HLD. We reviewed levels which are now well controlled. No issues with statin.

## 2022-04-22 ENCOUNTER — TRANSCRIPTION ENCOUNTER (OUTPATIENT)
Age: 72
End: 2022-04-22

## 2022-04-22 LAB
ALBUMIN SERPL ELPH-MCNC: 4.5 G/DL
ALP BLD-CCNC: 99 U/L
ALT SERPL-CCNC: 22 U/L
ANION GAP SERPL CALC-SCNC: 10 MMOL/L
AST SERPL-CCNC: 22 U/L
BILIRUB SERPL-MCNC: 0.4 MG/DL
BUN SERPL-MCNC: 13 MG/DL
CALCIUM SERPL-MCNC: 9.3 MG/DL
CHLORIDE SERPL-SCNC: 107 MMOL/L
CO2 SERPL-SCNC: 26 MMOL/L
CREAT SERPL-MCNC: 1.01 MG/DL
EGFR: 79 ML/MIN/1.73M2
GLUCOSE SERPL-MCNC: 103 MG/DL
POTASSIUM SERPL-SCNC: 4.6 MMOL/L
PROT SERPL-MCNC: 6.4 G/DL
SODIUM SERPL-SCNC: 143 MMOL/L

## 2022-04-24 ENCOUNTER — NON-APPOINTMENT (OUTPATIENT)
Age: 72
End: 2022-04-24

## 2022-04-25 ENCOUNTER — TRANSCRIPTION ENCOUNTER (OUTPATIENT)
Age: 72
End: 2022-04-25

## 2022-06-10 ENCOUNTER — NON-APPOINTMENT (OUTPATIENT)
Age: 72
End: 2022-06-10

## 2022-06-13 ENCOUNTER — TRANSCRIPTION ENCOUNTER (OUTPATIENT)
Age: 72
End: 2022-06-13

## 2022-06-15 ENCOUNTER — NON-APPOINTMENT (OUTPATIENT)
Age: 72
End: 2022-06-15

## 2022-06-15 ENCOUNTER — APPOINTMENT (OUTPATIENT)
Dept: HEART AND VASCULAR | Facility: CLINIC | Age: 72
End: 2022-06-15
Payer: MEDICARE

## 2022-06-15 VITALS
HEIGHT: 70 IN | SYSTOLIC BLOOD PRESSURE: 144 MMHG | TEMPERATURE: 98 F | DIASTOLIC BLOOD PRESSURE: 82 MMHG | WEIGHT: 160 LBS | HEART RATE: 70 BPM | BODY MASS INDEX: 22.9 KG/M2 | OXYGEN SATURATION: 96 %

## 2022-06-15 PROCEDURE — 93000 ELECTROCARDIOGRAM COMPLETE: CPT

## 2022-06-15 PROCEDURE — 99204 OFFICE O/P NEW MOD 45 MIN: CPT

## 2022-06-15 RX ORDER — TADALAFIL 5 MG/1
5 TABLET ORAL
Qty: 30 | Refills: 2 | Status: COMPLETED | COMMUNITY
Start: 2021-07-23 | End: 2022-06-15

## 2022-06-15 RX ORDER — TRIAMCINOLONE ACETONIDE 1 MG/G
0.1 OINTMENT TOPICAL
Qty: 1 | Refills: 1 | Status: COMPLETED | COMMUNITY
Start: 2021-04-09 | End: 2022-06-15

## 2022-06-15 NOTE — REASON FOR VISIT
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 He had a few chest pain and palpitations episodes. His symptoms noted at rest and with activity. Associated fatigue noted. No prior cardiac testing. Shortness of breath noted at times as well. We are discussing a work up

## 2022-06-15 NOTE — DISCUSSION/SUMMARY
[FreeTextEntry1] : CP,palps,dyspnea will bring in for echocardiogram and a cardiac cta\par Borderline HTN - ISH  and I had an extensive discussion regarding his blood pressure management. Patient will continue taking current medications in addition to maintaining a low Na diet, with periodic b/p checks at home.\par HLD ISH and I discussed his lipid panel and individualized target LDL goal. At this point, will do diet and exercise with anticipation of re-evaluating labs in 3-6 months\par SB Inclined towards a conservative follow up in this patient. We had a careful discussion regarding diet and exercise. Will be happy to re-evaluate.\par EKG section of the chart --- secondary to symptoms above an electrocardiogram also known as an EKG was performed.  Risks and benefits discussed with the patient. Patient was given time and privacy to changed into a gown. Shortly after, standard 10 leads were applied and a Head Held High system was used to perform the study. The results were subsequently reviewed by attending physician and discussed with the patient. The study showed a normal sinus rhythm and no ST-T suggestive of ischemia. Order for the EKG was placed in the chart. The results were documented. Billing submitted. Emotional support provided.\par

## 2022-06-16 ENCOUNTER — RESULT REVIEW (OUTPATIENT)
Age: 72
End: 2022-06-16

## 2022-06-16 ENCOUNTER — OUTPATIENT (OUTPATIENT)
Dept: OUTPATIENT SERVICES | Facility: HOSPITAL | Age: 72
LOS: 1 days | End: 2022-06-16

## 2022-06-16 ENCOUNTER — APPOINTMENT (OUTPATIENT)
Dept: CT IMAGING | Facility: CLINIC | Age: 72
End: 2022-06-16
Payer: MEDICARE

## 2022-06-16 PROCEDURE — 75574 CT ANGIO HRT W/3D IMAGE: CPT | Mod: 26,MH

## 2022-06-23 ENCOUNTER — APPOINTMENT (OUTPATIENT)
Dept: HEART AND VASCULAR | Facility: CLINIC | Age: 72
End: 2022-06-23
Payer: MEDICARE

## 2022-06-23 DIAGNOSIS — R07.9 CHEST PAIN, UNSPECIFIED: ICD-10-CM

## 2022-06-23 PROCEDURE — 93306 TTE W/DOPPLER COMPLETE: CPT

## 2022-06-24 ENCOUNTER — APPOINTMENT (OUTPATIENT)
Dept: HEART AND VASCULAR | Facility: CLINIC | Age: 72
End: 2022-06-24
Payer: MEDICARE

## 2022-06-24 VITALS
SYSTOLIC BLOOD PRESSURE: 145 MMHG | HEIGHT: 70 IN | OXYGEN SATURATION: 97 % | DIASTOLIC BLOOD PRESSURE: 79 MMHG | HEART RATE: 61 BPM | TEMPERATURE: 97 F | WEIGHT: 160 LBS | BODY MASS INDEX: 22.9 KG/M2

## 2022-06-24 DIAGNOSIS — E78.1 PURE HYPERGLYCERIDEMIA: ICD-10-CM

## 2022-06-24 DIAGNOSIS — R94.31 ABNORMAL ELECTROCARDIOGRAM [ECG] [EKG]: ICD-10-CM

## 2022-06-24 PROBLEM — R07.9 CHEST PAIN: Status: ACTIVE | Noted: 2021-03-23

## 2022-06-24 PROCEDURE — 99213 OFFICE O/P EST LOW 20 MIN: CPT

## 2022-06-24 NOTE — REASON FOR VISIT
[Symptom and Test Evaluation] : symptom and test evaluation [FreeTextEntry1] : Follow up on dizziness episode s/p echo and CCTA. We are discussing results and management.

## 2022-06-24 NOTE — DISCUSSION/SUMMARY
[FreeTextEntry1] : Dizziness echo and ccta reviewed. cardiac cause of dizziness unlikely, given the findings. Will move forward with a conservative follow up and re-evaluation\par HLD cont statin

## 2022-08-01 ENCOUNTER — LABORATORY RESULT (OUTPATIENT)
Age: 72
End: 2022-08-01

## 2022-08-01 ENCOUNTER — APPOINTMENT (OUTPATIENT)
Dept: DERMATOLOGY | Facility: CLINIC | Age: 72
End: 2022-08-01

## 2022-08-01 DIAGNOSIS — L73.9 FOLLICULAR DISORDER, UNSPECIFIED: ICD-10-CM

## 2022-08-01 PROCEDURE — 11103 TANGNTL BX SKIN EA SEP/ADDL: CPT | Mod: 59

## 2022-08-01 PROCEDURE — 99214 OFFICE O/P EST MOD 30 MIN: CPT | Mod: 25

## 2022-08-01 PROCEDURE — 11102 TANGNTL BX SKIN SINGLE LES: CPT

## 2022-08-01 NOTE — PHYSICAL EXAM
[Alert] : alert [Oriented x 3] : ~L oriented x 3 [Well Nourished] : well nourished [Conjunctiva Non-injected] : conjunctiva non-injected [No Visual Lymphadenopathy] : no visual  lymphadenopathy [No Clubbing] : no clubbing [No Edema] : no edema [No Bromhidrosis] : no bromhidrosis [No Chromhidrosis] : no chromhidrosis [Full Body Skin Exam Performed] : performed [FreeTextEntry3] : WHI near R NL fold\par follicular papule scalp\par WHI back\par xerosis legs \par white skin\par L medial chest ill defined prominent brown reticular macule\par R chest pink papule with terminal hairs and adjacent small brown macule

## 2022-08-01 NOTE — HISTORY OF PRESENT ILLNESS
PHONE REPORT GIVEN TO ZUNILDA TREJO FreeTextEntry1] : fu bcc [de-identified] : physician, wife alina also my patient \par estab of nunez - hx bcc cheek ~2016, last cbe april 2021\par given tac for eczema on back - used cream instead of oint\par comes and goes more in winter\par

## 2022-08-15 ENCOUNTER — NON-APPOINTMENT (OUTPATIENT)
Age: 72
End: 2022-08-15

## 2022-09-06 ENCOUNTER — RX RENEWAL (OUTPATIENT)
Age: 72
End: 2022-09-06

## 2022-09-27 ENCOUNTER — APPOINTMENT (OUTPATIENT)
Dept: HEART AND VASCULAR | Facility: CLINIC | Age: 72
End: 2022-09-27

## 2022-11-27 ENCOUNTER — NON-APPOINTMENT (OUTPATIENT)
Age: 72
End: 2022-11-27

## 2022-11-28 ENCOUNTER — NON-APPOINTMENT (OUTPATIENT)
Age: 72
End: 2022-11-28

## 2022-11-28 DIAGNOSIS — U07.1 COVID-19: Chronic | ICD-10-CM

## 2022-11-29 ENCOUNTER — TRANSCRIPTION ENCOUNTER (OUTPATIENT)
Age: 72
End: 2022-11-29

## 2023-01-24 ENCOUNTER — NON-APPOINTMENT (OUTPATIENT)
Age: 73
End: 2023-01-24

## 2023-01-24 ENCOUNTER — LABORATORY RESULT (OUTPATIENT)
Age: 73
End: 2023-01-24

## 2023-01-25 ENCOUNTER — APPOINTMENT (OUTPATIENT)
Dept: DERMATOLOGY | Facility: CLINIC | Age: 73
End: 2023-01-25
Payer: MEDICARE

## 2023-01-25 DIAGNOSIS — D48.5 NEOPLASM OF UNCERTAIN BEHAVIOR OF SKIN: ICD-10-CM

## 2023-01-25 DIAGNOSIS — L70.0 ACNE VULGARIS: ICD-10-CM

## 2023-01-25 DIAGNOSIS — L82.0 INFLAMED SEBORRHEIC KERATOSIS: ICD-10-CM

## 2023-01-25 PROCEDURE — 11102 TANGNTL BX SKIN SINGLE LES: CPT | Mod: 59

## 2023-01-25 PROCEDURE — 99213 OFFICE O/P EST LOW 20 MIN: CPT | Mod: 25

## 2023-01-25 PROCEDURE — 17110 DESTRUCTION B9 LES UP TO 14: CPT | Mod: 59

## 2023-01-25 NOTE — PHYSICAL EXAM
[Alert] : alert [Oriented x 3] : ~L oriented x 3 [Well Nourished] : well nourished [Conjunctiva Non-injected] : conjunctiva non-injected [No Visual Lymphadenopathy] : no visual  lymphadenopathy [No Clubbing] : no clubbing [No Edema] : no edema [No Bromhidrosis] : no bromhidrosis [No Chromhidrosis] : no chromhidrosis [FreeTextEntry3] : L scapula with open comedone and keratin debris at precise location indicated by patient\par R parietal scalp stuck on cerebriform plaque \par R upper chest dark brown asymmetric macule

## 2023-01-25 NOTE — HISTORY OF PRESENT ILLNESS
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

## 2023-01-29 ENCOUNTER — LABORATORY RESULT (OUTPATIENT)
Age: 73
End: 2023-01-29

## 2023-01-30 ENCOUNTER — APPOINTMENT (OUTPATIENT)
Dept: UROLOGY | Facility: CLINIC | Age: 73
End: 2023-01-30
Payer: MEDICARE

## 2023-01-30 PROCEDURE — 99214 OFFICE O/P EST MOD 30 MIN: CPT

## 2023-01-30 PROCEDURE — 51798 US URINE CAPACITY MEASURE: CPT

## 2023-01-30 PROCEDURE — 51741 ELECTRO-UROFLOWMETRY FIRST: CPT

## 2023-01-30 NOTE — HISTORY OF PRESENT ILLNESS
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 He notes marked improvement in urinary urgency. He still notes decreased urinary stream.\par He feels the libido has diminished since on finasteride; although, acknowledges diminished libido prior to starting. \par \par 8.6.2018\par MRI no evidence of areas concern\par 4K low risk\par complaining of a penile curvature for the last six months\par ? worsened recently does not preclude SI\par not painful\par libido has diminished; had preceded the implementation of finasteride\par low ejaculate volume since alfuzosin\par continues to be sexually active but less frequent\par Urinary symptoms improved on finasteride \par \par Patient is currently experiencing weak stream, but no urinary incontinence and no urinary urgency  \par The patient presents with complaints of nocturia (once (marked improvement)). \par The patient presents with complaints of erectile dysfunction (able to achieve intercourse; erections 7/10. curvature 30 %; ? change over last 6 months) \par  \par 1.25.2019\par patient returns for DUS\par he states that penile curvature preclude sexual intercourse\par he states that at the point of curvature there is penile instability\par he has avoided sexual intercourse\par \par \par 1.31.2020\par sexual dysfunction takes viagra\par it works but "annoying" to have to take medication\par low ejaculate volume on alfuzosin\par "sex less pleasurable"\par \par urinary symptoms; IPSS 13\par slow stream in AM before takes medication\par during day does well\par urinary retention after ear surgery\par \par 1.8.2021\par patient not having sexual intercourse \par "agreement" \par did not enjoy having taking medication\par \par IPSS 10\par ? dribbling\par on afluzosin and finasteride\par \par 7.23.2021\par complaining of mild left testicular discomfor tx 3 weeks\par mild gnawing\par no swelling erythema etc\par occasional masturbation\par no sexual activity\par poor quality erection not sufficient for sexual intercourse\par continues on alfuzosin and finasteride\par \par 2.1.2022\par testicular discomfort persists\par sexual function\par penetrates with poor erection; did not respond to daily cialis\par started sildenafil 40 mg did not improve\par experience premature ejaculation due to anxiety relating to performance\par decreased flow on finasteride and alfuzosin\par \par 1.30.2023\par increased urgency\par no incontinence\par postvoid dribbling\par urgency no incontinence\par nocturia x 1-3\par did not take ditropan\par \par sexual function terrible\par viagra works sufficient for SI; decreased ejaculate\par did not take cialis

## 2023-01-30 NOTE — PHYSICAL EXAM
[Abdomen Soft] : soft [Abdomen Tenderness] : non-tender [Costovertebral Angle Tenderness] : no ~M costovertebral angle tenderness [Urethral Meatus] : meatus normal [Penis Abnormality] : normal circumcised penis [Epididymis] : the epididymides were normal [Testes Tenderness] : no tenderness of the testes [Prostate Tenderness] : the prostate was not tender [No Prostate Nodules] : no prostate nodules [FreeTextEntry1] :  prostate asymmetry R>L;smooth;  FLOW  11 cc/sec Voided volume 249; PVR 48

## 2023-01-30 NOTE — ASSESSMENT
[FreeTextEntry1] : Urinary symptoms improved on Uroxatral and Finasteride\par Patient however complains of the effect on sexual function.  He states that he is able to get an erection and have intercourse however he has no sense of enjoyment due to the poor quality of the ejaculate on Uroxatral and finasteride.  We discussed proceeding with intervention such as UROLIFT and attempt to stop medications.  He is reluctant to do this.  We discussed the UROLIFT procedure.\par \par We discussed the utilization of Cialis 5 mg daily in attempt to treat his urinary and sexual dysfunction.  He wished to proceed with this at this time.\par \par We discussed proceeding with a renal and bladder ultrasound.\par \par His PSA data was reviewed.  He has had a reduction in PSA on finasteride.  He is status post biopsy.  He is status post MRIs and a 4K.  We will repeat his PSA at this point.  His examination is unremarkable.\par \par Elevated PSA \par .s/p TUSGbx\par s/p MRI x2\par s/p 4K\par Will repeat PSA\par discussed  repeating PSA and or MRI\par continue to Monitor\par ,\par 1.8.2021\par labs reviewed\par PSA stable after finasteride\par s/p MRI \par s/p biopsy\par EXAM CHANGED with assymetry of (R lobe greated than left)  recommend repeat MRI\par The [facility] MAT[facility]OROS  expressed fully understanding of the information provided, the consequences and the management.\par \par LUTs \par low flow and PVR (flow nondiagnostic\par on maximal management\par interested in UROLIFT after COVID crisis\par patient has been given Informational materials given to patient.\par \par \par sexual function\par responds to to PD5 with excellent erection\par however not willing to take PD5\par couple is not interested in addressing\par \par \par 7.23.2021\par LUTS intiial ; flow 8 cc /sec voided volume 88\par then voided to PVR 29\par reviewed UROLIFT\par discussed indication\par will attempt tadalafil 5 mg daily\par stop alfuzosin\par \par erectile dysfunction\par not willing to take PD5 prn\par discussed daily tadalafil for LUTS and ED\par will repeat endocrine\par \par PSA will repeat\par \par testicular discomfort\par epididymal tenderness\par We discussed conservative management with: a.  nonsteroidal anti-inflammatories, b. ice prn and c. scrotal support.\par \par \par 2/1/2022\par \par Patient returns with urinary symptoms.  His flow is low however the volume is nondiagnostic.  He has a small postvoid residual.  He complains of some urgency.  We previously discussed proceeding with a UroLift procedure.  He is reluctant to proceed with any intervention.  He is not happy with the retreatment rate reported for UroLift.  We discussed Rezum.  Previously we had discussed utilizing Vesicare.  Apparently he did not proceed with this.  We discussed attempting Myrbetriq however he was hypertensive and has had episodes of elevated blood pressure.  We will initiate therapy with Ditropan XL 5 mg.  We discussed the risk of urinary retention and side effects.\par Eliminate caffeinated products\par 1 coffee and several cola's per day\par \par He discussed complains of left testicular discomfort.  It is not severe.  It is a gnawing feeling.  There is no hernia.  His examination is unremarkable.  His left epididymis is slightly tender.  But this is within normal limits.  We discussed nonsteroidal anti-inflammatories.  We discussed the need to avoid self examination.  We will proceed with a scrotal ultrasound\par \par His sexual dysfunction continues to be a problem.  He is increased the dose of sildenafil to 40 mg.  He is not does progressed beyond this.  He states he is able to have intercourse with a soft penis.  He experiences premature ejaculation due to his frustration.  He states that he is avoiding sexual activity and feels this is sad.  We discussed dose escalation.\par \par He has an abnormal prostate exam.  He will undergo a repeat PSA.  He is status post MRI and biopsy.\par \par Plan\par .1 creatinine, PSA\par 2. urinalysis\par 3. conservative management\par 4. ditropan Xl 5 mg\par 5. fu  4 weeks\par \par 1.30.2023\par LUTS \par ED \par Sexual dysfunction\par \par PSA is stable and MRI done in 2021\par Exam unchanged\par \par I discussed with ISH MATAMOROS the possible indications for treatment of prostatic obstruction including:\par 1 urinary symptoms and quality of life issues\par 2.  Obstruction and urinary retention\par 3.  Infections\par 4.  Bladder stones\par 5.  Upper tract changes including hydronephrosis and renal dysfunction\par \par We had an extensive discussion about treatment options\par We discussed surgical and minimally invasive approaches  UROLIFT and Rezume\par We discussed medical management with alpha blocking agents (alfuzosin, tamsulosin doxazosin etc.)\par We discussed continuing finasteride to his regimen\par We discussed the impact of on PSA of finasteride\par HE is concerned re  impact of finasteride on sexual function\par \par DIscussed anticholinergics or vibegron\par patient is unwilling to proceed  with additional medical managed\par discussed cysto to evaluation\par \par Plan:\par 1. PSA\par 2. UA\par 3. urine culture\par 4. op cystoscopy\par Patient wants to proceed,\par

## 2023-01-31 ENCOUNTER — TRANSCRIPTION ENCOUNTER (OUTPATIENT)
Age: 73
End: 2023-01-31

## 2023-02-01 ENCOUNTER — NON-APPOINTMENT (OUTPATIENT)
Age: 73
End: 2023-02-01

## 2023-02-04 ENCOUNTER — NON-APPOINTMENT (OUTPATIENT)
Age: 73
End: 2023-02-04

## 2023-02-21 ENCOUNTER — TRANSCRIPTION ENCOUNTER (OUTPATIENT)
Age: 73
End: 2023-02-21

## 2023-02-23 ENCOUNTER — APPOINTMENT (OUTPATIENT)
Dept: UROLOGY | Facility: CLINIC | Age: 73
End: 2023-02-23
Payer: MEDICARE

## 2023-02-23 VITALS
HEIGHT: 70 IN | DIASTOLIC BLOOD PRESSURE: 80 MMHG | TEMPERATURE: 98.3 F | SYSTOLIC BLOOD PRESSURE: 139 MMHG | BODY MASS INDEX: 22.9 KG/M2 | WEIGHT: 160 LBS | HEART RATE: 76 BPM | OXYGEN SATURATION: 98 %

## 2023-02-23 DIAGNOSIS — R39.12 POOR URINARY STREAM: ICD-10-CM

## 2023-02-23 PROCEDURE — 52000 CYSTOURETHROSCOPY: CPT

## 2023-02-23 PROCEDURE — 99213 OFFICE O/P EST LOW 20 MIN: CPT | Mod: 25

## 2023-02-23 NOTE — ASSESSMENT
[FreeTextEntry1] : Elevated PSA (790.93) (R97.20)\par Difficulty urinating (788.99) (R39.198)\par Abnormal prostate by palpation (602.9) (N42.9)\par Erectile dysfunction (607.84) (N52.9)\par Urinary urgency (788.63) (R39.15)\par \par Urinary symptoms improved on Uroxatral and Finasteride\par Patient however complains of the effect on sexual function. He states that he is able to get an erection and have intercourse however he has no sense of enjoyment due to the poor quality of the ejaculate on Uroxatral and finasteride. We discussed proceeding with intervention such as UROLIFT and attempt to stop medications. He is reluctant to do this. We discussed the UROLIFT procedure.\par \par We discussed the utilization of Cialis 5 mg daily in attempt to treat his urinary and sexual dysfunction. He wished to proceed with this at this time.\par \par We discussed proceeding with a renal and bladder ultrasound.\par \par His PSA data was reviewed. He has had a reduction in PSA on finasteride. He is status post biopsy. He is status post MRIs and a 4K. We will repeat his PSA at this point. His examination is unremarkable.\par \par Elevated PSA \par .s/p TUSGbx\par s/p MRI x2\par s/p 4K\par Will repeat PSA\par discussed repeating PSA and or MRI\par continue to Monitor\par ,\par 1.8.2021\par labs reviewed\par PSA stable after finasteride\par s/p MRI \par s/p biopsy\par EXAM CHANGED with assymetry of (R lobe greated than left) recommend repeat MRI\par The [facility] expressed fully understanding of the information provided, the consequences and the management.\par \par LUTs \par low flow and PVR (flow nondiagnostic\par on maximal management\par interested in UROLIFT after COVID crisis\par patient has been given Informational materials given to patient.\par \par \par sexual function\par responds to to PD5 with excellent erection\par however not willing to take PD5\par couple is not interested in addressing\par \par \par 7.23.2021\par LUTS intiial ; flow 8 cc /sec voided volume 88\par then voided to PVR 29\par reviewed UROLIFT\par discussed indication\par will attempt tadalafil 5 mg daily\par stop alfuzosin\par \par erectile dysfunction\par not willing to take PD5 prn\par discussed daily tadalafil for LUTS and ED\par will repeat endocrine\par \par PSA will repeat\par \par testicular discomfort\par epididymal tenderness\par We discussed conservative management with: a. nonsteroidal anti-inflammatories, b. ice prn and c. scrotal support.\par \par \par 2/1/2022\par \par Patient returns with urinary symptoms. His flow is low however the volume is nondiagnostic. He has a small postvoid residual. He complains of some urgency. We previously discussed proceeding with a UroLift procedure. He is reluctant to proceed with any intervention. He is not happy with the retreatment rate reported for UroLift. We discussed Rezum. Previously we had discussed utilizing Vesicare. Apparently he did not proceed with this. We discussed attempting Myrbetriq however he was hypertensive and has had episodes of elevated blood pressure. We will initiate therapy with Ditropan XL 5 mg. We discussed the risk of urinary retention and side effects.\par Eliminate caffeinated products\par 1 coffee and several cola's per day\par \par He discussed complains of left testicular discomfort. It is not severe. It is a gnawing feeling. There is no hernia. His examination is unremarkable. His left epididymis is slightly tender. But this is within normal limits. We discussed nonsteroidal anti-inflammatories. We discussed the need to avoid self examination. We will proceed with a scrotal ultrasound\par \par His sexual dysfunction continues to be a problem. He is increased the dose of sildenafil to 40 mg. He is not does progressed beyond this. He states he is able to have intercourse with a soft penis. He experiences premature ejaculation due to his frustration. He states that he is avoiding sexual activity and feels this is sad. We discussed dose escalation.\par \par He has an abnormal prostate exam. He will undergo a repeat PSA. He is status post MRI and biopsy.\par \par Plan\par .1 creatinine, PSA\par 2. urinalysis\par 3. conservative management\par 4. ditropan Xl 5 mg\par 5. fu 4 weeks\par \par 1.30.2023\par LUTS \par ED \par Sexual dysfunction\par \par PSA is stable and MRI done in 2021\par Exam unchanged\par \par I discussed with ISH MATAMOROS the possible indications for treatment of prostatic obstruction including:\par 1 urinary symptoms and quality of life issues\par 2. Obstruction and urinary retention\par 3. Infections\par 4. Bladder stones\par 5. Upper tract changes including hydronephrosis and renal dysfunction\par \par We had an extensive discussion about treatment options\par We discussed surgical and minimally invasive approaches UROLIFT and Rezume\par We discussed medical management with alpha blocking agents (alfuzosin, tamsulosin doxazosin etc.)\par We discussed continuing finasteride to his regimen\par We discussed the impact of on PSA of finasteride\par HE is concerned re impact of finasteride on sexual function\par \par DIscussed anticholinergics or vibegron\par patient is unwilling to proceed with additional medical managed\par discussed cysto to evaluation\par \par Plan:\par 1. PSA\par 2. UA\par 3. urine culture\par 4. op cystoscopy\par Patient wants to proceed,\par \par 2.23.23\par Meeting with patient and wife\par We discussed ,at length, his BPH and LUTS again, specifically minimally invasive procedure including urolofit and Rezum. After long discussion, patient is leaning toward Rezum in office. We discussed risks and benefits of the procedure. Risks include infection, bleeding, retrograde ejaculation, not improvement of LUTS. Patient understands these risks are very low but do exist. \par \par Patient understands that he will have a catheter after procedure for 3-5 days. He will initially need to continue medication. The goal is to be medication free, but he understands that he may not be able to get off medications and there is risk for need for additional procedures in the future. \par \par Plan:\par 1. Patient will decide if he wishes to proceed with Rezum\par 2. Schedule procedure\par 3. UA, UCx\par 4. Continue alfuzosin for now

## 2023-02-23 NOTE — HISTORY OF PRESENT ILLNESS
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 He notes marked improvement in urinary urgency. He still notes decreased urinary stream.\par He feels the libido has diminished since on finasteride; although, acknowledges diminished libido prior to starting. \par \par 8.6.2018\par MRI no evidence of areas concern\par 4K low risk\par complaining of a penile curvature for the last six months\par ? worsened recently does not preclude SI\par not painful\par libido has diminished; had preceded the implementation of finasteride\par low ejaculate volume since alfuzosin\par continues to be sexually active but less frequent\par Urinary symptoms improved on finasteride \par \par Patient is currently experiencing weak stream, but no urinary incontinence and no urinary urgency \par The patient presents with complaints of nocturia (once (marked improvement)). \par The patient presents with complaints of erectile dysfunction (able to achieve intercourse; erections 7/10. curvature 30 %; ? change over last 6 months) \par  \par 1.25.2019\par patient returns for DUS\par he states that penile curvature preclude sexual intercourse\par he states that at the point of curvature there is penile instability\par he has avoided sexual intercourse\par \par \par 1.31.2020\par sexual dysfunction takes viagra\par it works but "annoying" to have to take medication\par low ejaculate volume on alfuzosin\par "sex less pleasurable"\par \par urinary symptoms; IPSS 13\par slow stream in AM before takes medication\par during day does well\par urinary retention after ear surgery\par \par 1.8.2021\par patient not having sexual intercourse \par "agreement" \par did not enjoy having taking medication\par \par IPSS 10\par ? dribbling\par on afluzosin and finasteride\par \par 7.23.2021\par complaining of mild left testicular discomfor tx 3 weeks\par mild gnawing\par no swelling erythema etc\par occasional masturbation\par no sexual activity\par poor quality erection not sufficient for sexual intercourse\par continues on alfuzosin and finasteride\par \par 2.1.2022\par testicular discomfort persists\par sexual function\par penetrates with poor erection; did not respond to daily cialis\par started sildenafil 40 mg did not improve\par experience premature ejaculation due to anxiety relating to performance\par decreased flow on finasteride and alfuzosin\par \par 1.30.2023\par increased urgency\par no incontinence\par postvoid dribbling\par urgency no incontinence\par nocturia x 1-3\par did not take ditropan\par \par sexual function terrible\par viagra works sufficient for SI; decreased ejaculate\par did not take cialis\par \par 2.23.23\par here for cysto \par to discuss options for bph again

## 2023-02-24 LAB
APPEARANCE: CLEAR
BACTERIA: NEGATIVE
BILIRUBIN URINE: NEGATIVE
BLOOD URINE: NEGATIVE
COLOR: ABNORMAL
GLUCOSE QUALITATIVE U: NEGATIVE
HYALINE CASTS: 1 /LPF
KETONES URINE: NEGATIVE
LEUKOCYTE ESTERASE URINE: NEGATIVE
MICROSCOPIC-UA: NORMAL
NITRITE URINE: NEGATIVE
PH URINE: 5.5
PROTEIN URINE: NORMAL
RED BLOOD CELLS URINE: 2 /HPF
SPECIFIC GRAVITY URINE: 1.03
SQUAMOUS EPITHELIAL CELLS: 0 /HPF
UROBILINOGEN URINE: NORMAL
WHITE BLOOD CELLS URINE: 1 /HPF

## 2023-02-25 LAB — BACTERIA UR CULT: NORMAL

## 2023-03-08 ENCOUNTER — NON-APPOINTMENT (OUTPATIENT)
Age: 73
End: 2023-03-08

## 2023-03-22 ENCOUNTER — TRANSCRIPTION ENCOUNTER (OUTPATIENT)
Age: 73
End: 2023-03-22

## 2023-03-23 ENCOUNTER — APPOINTMENT (OUTPATIENT)
Dept: UROLOGY | Facility: CLINIC | Age: 73
End: 2023-03-23
Payer: MEDICARE

## 2023-03-23 LAB
BILIRUB UR QL STRIP: NORMAL
CLARITY UR: CLEAR
COLLECTION METHOD: NORMAL
GLUCOSE UR-MCNC: NORMAL
HCG UR QL: 0.2 EU/DL
HGB UR QL STRIP.AUTO: NORMAL
KETONES UR-MCNC: NORMAL
LEUKOCYTE ESTERASE UR QL STRIP: NORMAL
NITRITE UR QL STRIP: NORMAL
PH UR STRIP: 5.5
PROT UR STRIP-MCNC: NORMAL
SP GR UR STRIP: 1.03

## 2023-03-23 PROCEDURE — 76872 US TRANSRECTAL: CPT | Mod: 52,59

## 2023-03-23 PROCEDURE — 53854Z: CUSTOM

## 2023-03-23 PROCEDURE — 81003 URINALYSIS AUTO W/O SCOPE: CPT | Mod: QW

## 2023-03-24 ENCOUNTER — TRANSCRIPTION ENCOUNTER (OUTPATIENT)
Age: 73
End: 2023-03-24

## 2023-03-24 DIAGNOSIS — N32.89 OTHER SPECIFIED DISORDERS OF BLADDER: ICD-10-CM

## 2023-03-24 RX ORDER — OXYBUTYNIN CHLORIDE 5 MG/1
5 TABLET ORAL
Qty: 6 | Refills: 0 | Status: ACTIVE | COMMUNITY
Start: 2023-03-24 | End: 1900-01-01

## 2023-03-27 ENCOUNTER — APPOINTMENT (OUTPATIENT)
Dept: UROLOGY | Facility: CLINIC | Age: 73
End: 2023-03-27
Payer: MEDICARE

## 2023-03-27 DIAGNOSIS — N40.1 BENIGN PROSTATIC HYPERPLASIA WITH LOWER URINARY TRACT SYMPMS: ICD-10-CM

## 2023-03-27 PROCEDURE — A4216: CPT | Mod: NC

## 2023-03-27 PROCEDURE — 51700 IRRIGATION OF BLADDER: CPT

## 2023-03-27 NOTE — HISTORY OF PRESENT ILLNESS
[FreeTextEntry1] : 72 yr old male, patient of Dr. Jiménez, here for TOV after Rezum on 3/23/23. He had some catheter related discomfort on Friday. He was given oxybutynin 5 mg and took 1 dose in the evening and his bladder spasms were relieved. \par \par TOV:\par -Instilled 120 cc of sterile water into exisiting catheter\par -Cath removed in its entirety\par -PAtient promptly voided 120 cc of clear yellow urine\par - PVR: 0 ml

## 2023-03-27 NOTE — PHYSICAL EXAM
[General Appearance - Well Developed] : well developed [General Appearance - Well Nourished] : well nourished [Normal Appearance] : normal appearance [Well Groomed] : well groomed [General Appearance - In No Acute Distress] : no acute distress [Abdomen Soft] : soft [Abdomen Tenderness] : non-tender [Costovertebral Angle Tenderness] : no ~M costovertebral angle tenderness [Urethral Meatus] : meatus normal [Edema] : no peripheral edema [] : no respiratory distress [Respiration, Rhythm And Depth] : normal respiratory rhythm and effort [Exaggerated Use Of Accessory Muscles For Inspiration] : no accessory muscle use [Oriented To Time, Place, And Person] : oriented to person, place, and time [Affect] : the affect was normal [Mood] : the mood was normal [Not Anxious] : not anxious [Normal Station and Gait] : the gait and station were normal for the patient's age

## 2023-03-27 NOTE — ASSESSMENT
[FreeTextEntry1] : 72 yr old male presents for TOV. Passed TOV today without issue. Patient to continue his Flomax daily. Follow up as planned with Dr. Jiménez. \par \par Plan:\par 1. Continue Flomax\par 2. RTC as planned with Dr. Jiménez

## 2023-04-20 ENCOUNTER — TRANSCRIPTION ENCOUNTER (OUTPATIENT)
Age: 73
End: 2023-04-20

## 2023-04-24 ENCOUNTER — RESULT CHARGE (OUTPATIENT)
Age: 73
End: 2023-04-24

## 2023-04-25 ENCOUNTER — TRANSCRIPTION ENCOUNTER (OUTPATIENT)
Age: 73
End: 2023-04-25

## 2023-04-26 ENCOUNTER — NON-APPOINTMENT (OUTPATIENT)
Age: 73
End: 2023-04-26

## 2023-04-26 ENCOUNTER — APPOINTMENT (OUTPATIENT)
Dept: FAMILY MEDICINE | Facility: CLINIC | Age: 73
End: 2023-04-26
Payer: MEDICARE

## 2023-04-26 PROCEDURE — 36415 COLL VENOUS BLD VENIPUNCTURE: CPT

## 2023-04-26 PROCEDURE — 93000 ELECTROCARDIOGRAM COMPLETE: CPT

## 2023-04-27 ENCOUNTER — APPOINTMENT (OUTPATIENT)
Dept: FAMILY MEDICINE | Facility: CLINIC | Age: 73
End: 2023-04-27
Payer: MEDICARE

## 2023-04-27 ENCOUNTER — NON-APPOINTMENT (OUTPATIENT)
Age: 73
End: 2023-04-27

## 2023-04-27 VITALS
SYSTOLIC BLOOD PRESSURE: 134 MMHG | TEMPERATURE: 97.4 F | DIASTOLIC BLOOD PRESSURE: 82 MMHG | BODY MASS INDEX: 23.05 KG/M2 | WEIGHT: 161 LBS | HEIGHT: 70 IN | HEART RATE: 56 BPM | OXYGEN SATURATION: 99 %

## 2023-04-27 DIAGNOSIS — Z01.818 ENCOUNTER FOR OTHER PREPROCEDURAL EXAMINATION: ICD-10-CM

## 2023-04-27 LAB
ALBUMIN SERPL ELPH-MCNC: 4.9 G/DL
ALP BLD-CCNC: 119 U/L
ALT SERPL-CCNC: 22 U/L
ANION GAP SERPL CALC-SCNC: 14 MMOL/L
APPEARANCE: CLEAR
APTT BLD: 36.3 SEC
AST SERPL-CCNC: 22 U/L
BASOPHILS # BLD AUTO: 0.03 K/UL
BASOPHILS NFR BLD AUTO: 0.5 %
BILIRUB SERPL-MCNC: 0.5 MG/DL
BILIRUBIN URINE: NEGATIVE
BLOOD URINE: NEGATIVE
BUN SERPL-MCNC: 16 MG/DL
CALCIUM SERPL-MCNC: 9.8 MG/DL
CHLORIDE SERPL-SCNC: 102 MMOL/L
CO2 SERPL-SCNC: 27 MMOL/L
COLOR: YELLOW
CREAT SERPL-MCNC: 0.96 MG/DL
EGFR: 83 ML/MIN/1.73M2
EOSINOPHIL # BLD AUTO: 0.48 K/UL
EOSINOPHIL NFR BLD AUTO: 7.4 %
GLUCOSE QUALITATIVE U: NEGATIVE MG/DL
GLUCOSE SERPL-MCNC: 92 MG/DL
HCT VFR BLD CALC: 47 %
HGB BLD-MCNC: 15.4 G/DL
IMM GRANULOCYTES NFR BLD AUTO: 0.2 %
INR PPP: 0.92 RATIO
KETONES URINE: NEGATIVE MG/DL
LEUKOCYTE ESTERASE URINE: NEGATIVE
LYMPHOCYTES # BLD AUTO: 1.79 K/UL
LYMPHOCYTES NFR BLD AUTO: 27.7 %
MAN DIFF?: NORMAL
MCHC RBC-ENTMCNC: 29.3 PG
MCHC RBC-ENTMCNC: 32.8 GM/DL
MCV RBC AUTO: 89.4 FL
MONOCYTES # BLD AUTO: 0.37 K/UL
MONOCYTES NFR BLD AUTO: 5.7 %
NEUTROPHILS # BLD AUTO: 3.79 K/UL
NEUTROPHILS NFR BLD AUTO: 58.5 %
NITRITE URINE: NEGATIVE
PH URINE: 6
PLATELET # BLD AUTO: 212 K/UL
POTASSIUM SERPL-SCNC: 4.6 MMOL/L
PROT SERPL-MCNC: 7 G/DL
PROTEIN URINE: NEGATIVE MG/DL
PT BLD: 10.7 SEC
RBC # BLD: 5.26 M/UL
RBC # FLD: 12.6 %
SODIUM SERPL-SCNC: 143 MMOL/L
SPECIFIC GRAVITY URINE: 1.01
UROBILINOGEN URINE: 0.2 MG/DL
WBC # FLD AUTO: 6.47 K/UL

## 2023-04-27 PROCEDURE — 99214 OFFICE O/P EST MOD 30 MIN: CPT | Mod: 25

## 2023-04-27 PROCEDURE — 36415 COLL VENOUS BLD VENIPUNCTURE: CPT

## 2023-04-28 LAB — APTT BLD: 33.3 SEC

## 2023-04-28 NOTE — HISTORY OF PRESENT ILLNESS
[No Pertinent Cardiac History] : no history of aortic stenosis, atrial fibrillation, coronary artery disease, recent myocardial infarction, or implantable device/pacemaker [No Pertinent Pulmonary History] : no history of asthma, COPD, sleep apnea, or smoking [No Adverse Anesthesia Reaction] : no adverse anesthesia reaction in self or family member [(Patient denies any chest pain, claudication, dyspnea on exertion, orthopnea, palpitations or syncope)] : Patient denies any chest pain, claudication, dyspnea on exertion, orthopnea, palpitations or syncope [Chronic Anticoagulation] : no chronic anticoagulation [Chronic Kidney Disease] : no chronic kidney disease [Diabetes] : no diabetes [FreeTextEntry1] : Tympanoplasty and eustachian tube dilatation [FreeTextEntry2] : 5/18/23 [FreeTextEntry3] : Dr Gary Shah fax [phone] [FreeTextEntry4] : 73 yo male here for preop for tympanoplasty and eustachian tube dilatation. Feeling well today, no complaints.

## 2023-04-28 NOTE — ASSESSMENT
[High Risk Surgery - Intraperitoneal, Intrathoracic or Supringuinal Vascular Procedures] : High Risk Surgery - Intraperitoneal, Intrathoracic or Supringuinal Vascular Procedures - No (0) [Ischemic Heart Disease] : Ischemic Heart Disease - No (0) [Congestive Heart Failure] : Congestive Heart Failure - No (0) [Prior Cerebrovascular Accident or TIA] : Prior Cerebrovascular Accident or TIA - No (0) [Creatinine >= 2mg/dL (1 Point)] : Creatinine >= 2mg/dL - No (0) [Insulin-dependent Diabetic (1 Point)] : Insulin-dependent Diabetic - No (0) [0] : 0 , RCRI Class: I, Risk of Post-Op Cardiac Complications: 3.9%, 95% CI for Risk Estimate: 2.8% - 5.4% [Patient Optimized for Surgery] : Patient optimized for surgery [FreeTextEntry4] : 73 yo male here for preop for tympanoplasty and eustachian tube dilatation. Feeling well today, no complaints. Patient low risk for intermediate risk surgery. Medically optimized at this time.

## 2023-05-02 RX ORDER — NIRMATRELVIR AND RITONAVIR 300-100 MG
20 X 150 MG & KIT ORAL
Qty: 1 | Refills: 0 | Status: DISCONTINUED | COMMUNITY
Start: 2022-11-28 | End: 2023-05-02

## 2023-05-02 RX ORDER — TRIAMCINOLONE ACETONIDE 5 MG/G
CREAM TOPICAL
Refills: 0 | Status: DISCONTINUED | COMMUNITY
End: 2023-05-02

## 2023-05-02 RX ORDER — OMEPRAZOLE 20 MG/1
20 CAPSULE, DELAYED RELEASE ORAL
Qty: 90 | Refills: 0 | Status: DISCONTINUED | COMMUNITY
Start: 2022-06-09 | End: 2023-05-02

## 2023-05-02 RX ORDER — PHENAZOPYRIDINE 200 MG/1
200 TABLET, FILM COATED ORAL 3 TIMES DAILY
Qty: 12 | Refills: 0 | Status: DISCONTINUED | COMMUNITY
Start: 2023-03-24 | End: 2023-05-02

## 2023-05-02 RX ORDER — CICLOPIROX 10 MG/.96ML
1 SHAMPOO TOPICAL
Qty: 1 | Refills: 3 | Status: DISCONTINUED | COMMUNITY
Start: 2022-08-01 | End: 2023-05-02

## 2023-05-02 RX ORDER — SULFAMETHOXAZOLE AND TRIMETHOPRIM 800; 160 MG/1; MG/1
800-160 TABLET ORAL TWICE DAILY
Qty: 10 | Refills: 0 | Status: DISCONTINUED | COMMUNITY
Start: 2023-03-23 | End: 2023-05-02

## 2023-05-17 ENCOUNTER — TRANSCRIPTION ENCOUNTER (OUTPATIENT)
Age: 73
End: 2023-05-17

## 2023-05-17 NOTE — ASU PATIENT PROFILE, ADULT - NSICDXPASTSURGICALHX_GEN_ALL_CORE_FT
PAST SURGICAL HISTORY:  Acid reflux     BPH with elevated PSA     GERD (gastroesophageal reflux disease)     H/O eczema     H/O mastoidectomy     H/O rotator cuff surgery     History of high cholesterol     History of tonsillectomy     History of tympanostomy     S/P hernia repair

## 2023-05-17 NOTE — ASU PATIENT PROFILE, ADULT - NS PRO AD PATIENT TYPE
Mohsen Mansfield/Health Care Proxy (HCP)/Medical Orders for Life-Sustaining Treatment (MOLST)/Power of

## 2023-05-18 ENCOUNTER — TRANSCRIPTION ENCOUNTER (OUTPATIENT)
Age: 73
End: 2023-05-18

## 2023-05-18 ENCOUNTER — OUTPATIENT (OUTPATIENT)
Dept: OUTPATIENT SERVICES | Facility: HOSPITAL | Age: 73
LOS: 1 days | Discharge: HOME | End: 2023-05-18
Payer: MEDICARE

## 2023-05-18 VITALS
HEIGHT: 70 IN | SYSTOLIC BLOOD PRESSURE: 153 MMHG | OXYGEN SATURATION: 99 % | HEART RATE: 56 BPM | DIASTOLIC BLOOD PRESSURE: 70 MMHG | TEMPERATURE: 98 F | WEIGHT: 165.79 LBS | RESPIRATION RATE: 16 BRPM

## 2023-05-18 VITALS — SYSTOLIC BLOOD PRESSURE: 133 MMHG | OXYGEN SATURATION: 96 % | DIASTOLIC BLOOD PRESSURE: 74 MMHG | HEART RATE: 60 BPM

## 2023-05-18 DIAGNOSIS — N40.0 BENIGN PROSTATIC HYPERPLASIA WITHOUT LOWER URINARY TRACT SYMPTOMS: Chronic | ICD-10-CM

## 2023-05-18 DIAGNOSIS — K21.9 GASTRO-ESOPHAGEAL REFLUX DISEASE WITHOUT ESOPHAGITIS: Chronic | ICD-10-CM

## 2023-05-18 DIAGNOSIS — Z90.89 ACQUIRED ABSENCE OF OTHER ORGANS: Chronic | ICD-10-CM

## 2023-05-18 DIAGNOSIS — Z98.890 OTHER SPECIFIED POSTPROCEDURAL STATES: Chronic | ICD-10-CM

## 2023-05-18 DIAGNOSIS — Z86.39 PERSONAL HISTORY OF OTHER ENDOCRINE, NUTRITIONAL AND METABOLIC DISEASE: Chronic | ICD-10-CM

## 2023-05-18 DIAGNOSIS — Z87.2 PERSONAL HISTORY OF DISEASES OF THE SKIN AND SUBCUTANEOUS TISSUE: Chronic | ICD-10-CM

## 2023-05-18 PROCEDURE — 88305 TISSUE EXAM BY PATHOLOGIST: CPT | Mod: 26

## 2023-05-18 DEVICE — EPIDISC: Type: IMPLANTABLE DEVICE | Status: FUNCTIONAL

## 2023-05-18 DEVICE — SURGIFOAM PAD 8CM X 12.5CM X 10MM (100): Type: IMPLANTABLE DEVICE | Status: FUNCTIONAL

## 2023-05-18 DEVICE — LUKENS BONE WAX 2.5G: Type: IMPLANTABLE DEVICE | Status: FUNCTIONAL

## 2023-05-18 RX ORDER — FENTANYL CITRATE 50 UG/ML
25 INJECTION INTRAVENOUS
Refills: 0 | Status: DISCONTINUED | OUTPATIENT
Start: 2023-05-18 | End: 2023-05-18

## 2023-05-18 RX ORDER — APREPITANT 80 MG/1
40 CAPSULE ORAL ONCE
Refills: 0 | Status: COMPLETED | OUTPATIENT
Start: 2023-05-18 | End: 2023-05-18

## 2023-05-18 RX ORDER — ACETAMINOPHEN 500 MG
1000 TABLET ORAL ONCE
Refills: 0 | Status: COMPLETED | OUTPATIENT
Start: 2023-05-18 | End: 2023-05-18

## 2023-05-18 RX ORDER — SODIUM CHLORIDE 9 MG/ML
1000 INJECTION, SOLUTION INTRAVENOUS
Refills: 0 | Status: DISCONTINUED | OUTPATIENT
Start: 2023-05-18 | End: 2023-05-18

## 2023-05-18 RX ORDER — ALFUZOSIN HYDROCHLORIDE 10 MG/1
1 TABLET, EXTENDED RELEASE ORAL
Refills: 0 | DISCHARGE

## 2023-05-18 RX ORDER — SIMVASTATIN 20 MG/1
1 TABLET, FILM COATED ORAL
Refills: 0 | DISCHARGE

## 2023-05-18 RX ORDER — OXYCODONE HYDROCHLORIDE 5 MG/1
5 TABLET ORAL ONCE
Refills: 0 | Status: DISCONTINUED | OUTPATIENT
Start: 2023-05-18 | End: 2023-05-18

## 2023-05-18 RX ORDER — CELECOXIB 200 MG/1
200 CAPSULE ORAL ONCE
Refills: 0 | Status: DISCONTINUED | OUTPATIENT
Start: 2023-05-18 | End: 2023-05-18

## 2023-05-18 RX ORDER — FINASTERIDE 5 MG/1
1 TABLET, FILM COATED ORAL
Refills: 0 | DISCHARGE

## 2023-05-18 RX ORDER — ONDANSETRON 8 MG/1
4 TABLET, FILM COATED ORAL ONCE
Refills: 0 | Status: COMPLETED | OUTPATIENT
Start: 2023-05-18 | End: 2023-05-18

## 2023-05-18 RX ORDER — DIPHENHYDRAMINE HCL 50 MG
12.5 CAPSULE ORAL ONCE
Refills: 0 | Status: DISCONTINUED | OUTPATIENT
Start: 2023-05-18 | End: 2023-05-18

## 2023-05-18 RX ORDER — OMEPRAZOLE 10 MG/1
1 CAPSULE, DELAYED RELEASE ORAL
Refills: 0 | DISCHARGE

## 2023-05-18 RX ADMIN — Medication 1000 MILLIGRAM(S): at 10:25

## 2023-05-18 RX ADMIN — ONDANSETRON 4 MILLIGRAM(S): 8 TABLET, FILM COATED ORAL at 15:38

## 2023-05-18 RX ADMIN — APREPITANT 40 MILLIGRAM(S): 80 CAPSULE ORAL at 10:25

## 2023-05-18 NOTE — BRIEF OPERATIVE NOTE - NSICDXBRIEFPROCEDURE_GEN_ALL_CORE_FT
PROCEDURES:  Tympanomastoidectomy 18-May-2023 10:59:04  Gary Shah  Grafting, temporalis fascia 18-May-2023 10:59:13  Gary Shah  Dilation of both eustachian tubes 18-May-2023 10:59:29  Gary Shah

## 2023-05-18 NOTE — ASU DISCHARGE PLAN (ADULT/PEDIATRIC) - NS MD DC FALL RISK RISK
For information on Fall & Injury Prevention, visit: [email or web address] OR  [email or web address] OR  https://www.cdc.gov/steadi/patient.html

## 2023-05-18 NOTE — PRE-ANESTHESIA EVALUATION ADULT - NSANTHOSAYNRD_GEN_A_CORE
No. JAZMYNE screening performed.  STOP BANG Legend: 0-2 = LOW Risk; 3-4 = INTERMEDIATE Risk; 5-8 = HIGH Risk

## 2023-05-18 NOTE — ASU DISCHARGE PLAN (ADULT/PEDIATRIC) - PROCEDURE
revision right tympanomastoidectomy, palva flap, fascia graft, bilateral eustachian tube balloon dilation , inferior turbinate lateralization-bilateral, and facial nerve monitoring

## 2023-05-18 NOTE — BRIEF OPERATIVE NOTE - NSICDXBRIEFPREOP_GEN_ALL_CORE_FT
PRE-OP DIAGNOSIS:  Chronic otitis media 18-May-2023 10:59:46  Gary Shah  CHL (conductive hearing loss) 18-May-2023 10:59:56  Gary Shah  Dysfunction of both eustachian tubes 18-May-2023 11:00:04  Gary Shah

## 2023-05-18 NOTE — BRIEF OPERATIVE NOTE - NSICDXBRIEFPOSTOP_GEN_ALL_CORE_FT
POST-OP DIAGNOSIS:  Chronic otitis media 18-May-2023 11:00:22  Gary Shah  CHL (conductive hearing loss) 18-May-2023 11:00:32  Gary Shah  Dysfunction of both eustachian tubes 18-May-2023 11:00:41  Gary Shah

## 2023-05-19 LAB
GRAM STN FLD: SIGNIFICANT CHANGE UP
SPECIMEN SOURCE: SIGNIFICANT CHANGE UP

## 2023-05-24 LAB
-  CEFOXITIN: SIGNIFICANT CHANGE UP
-  CLINDAMYCIN: SIGNIFICANT CHANGE UP
-  ERYTHROMYCIN: SIGNIFICANT CHANGE UP
-  RIFAMPIN: SIGNIFICANT CHANGE UP
-  TRIMETHOPRIM/SULFAMETHOXAZOLE: SIGNIFICANT CHANGE UP
-  VANCOMYCIN: SIGNIFICANT CHANGE UP
CULTURE RESULTS: SIGNIFICANT CHANGE UP
METHOD TYPE: SIGNIFICANT CHANGE UP
METHOD TYPE: SIGNIFICANT CHANGE UP
ORGANISM # SPEC MICROSCOPIC CNT: SIGNIFICANT CHANGE UP
SPECIMEN SOURCE: SIGNIFICANT CHANGE UP

## 2023-06-05 ENCOUNTER — APPOINTMENT (OUTPATIENT)
Dept: UROLOGY | Facility: CLINIC | Age: 73
End: 2023-06-05
Payer: MEDICARE

## 2023-06-05 ENCOUNTER — LABORATORY RESULT (OUTPATIENT)
Age: 73
End: 2023-06-05

## 2023-06-05 VITALS
OXYGEN SATURATION: 98 % | TEMPERATURE: 98 F | SYSTOLIC BLOOD PRESSURE: 124 MMHG | DIASTOLIC BLOOD PRESSURE: 81 MMHG | HEART RATE: 70 BPM

## 2023-06-05 DIAGNOSIS — Z87.898 PERSONAL HISTORY OF OTHER SPECIFIED CONDITIONS: ICD-10-CM

## 2023-06-05 DIAGNOSIS — R68.82 DECREASED LIBIDO: ICD-10-CM

## 2023-06-05 PROCEDURE — 99214 OFFICE O/P EST MOD 30 MIN: CPT | Mod: 24

## 2023-06-05 PROCEDURE — 51798 US URINE CAPACITY MEASURE: CPT

## 2023-06-05 NOTE — HISTORY OF PRESENT ILLNESS
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 He notes marked improvement in urinary urgency. He still notes decreased urinary stream.\par He feels the libido has diminished since on finasteride; although, acknowledges diminished libido prior to starting. \par \par 8.6.2018\par MRI no evidence of areas concern\par 4K low risk\par complaining of a penile curvature for the last six months\par ? worsened recently does not preclude SI\par not painful\par libido has diminished; had preceded the implementation of finasteride\par low ejaculate volume since alfuzosin\par continues to be sexually active but less frequent\par Urinary symptoms improved on finasteride \par \par Patient is currently experiencing weak stream, but no urinary incontinence and no urinary urgency  \par The patient presents with complaints of nocturia (once (marked improvement)). \par The patient presents with complaints of erectile dysfunction (able to achieve intercourse; erections 7/10. curvature 30 %; ? change over last 6 months) \par  \par 1.25.2019\par patient returns for DUS\par he states that penile curvature preclude sexual intercourse\par he states that at the point of curvature there is penile instability\par he has avoided sexual intercourse\par \par \par 1.31.2020\par sexual dysfunction takes viagra\par it works but "annoying" to have to take medication\par low ejaculate volume on alfuzosin\par "sex less pleasurable"\par \par urinary symptoms; IPSS 13\par slow stream in AM before takes medication\par during day does well\par urinary retention after ear surgery\par \par 1.8.2021\par patient not having sexual intercourse \par "agreement" \par did not enjoy having taking medication\par \par IPSS 10\par ? dribbling\par on afluzosin and finasteride\par \par 7.23.2021\par complaining of mild left testicular discomfor tx 3 weeks\par mild gnawing\par no swelling erythema etc\par occasional masturbation\par no sexual activity\par poor quality erection not sufficient for sexual intercourse\par continues on alfuzosin and finasteride\par \par 2.1.2022\par testicular discomfort persists\par sexual function\par penetrates with poor erection; did not respond to daily cialis\par started sildenafil 40 mg did not improve\par experience premature ejaculation due to anxiety relating to performance\par decreased flow on finasteride and alfuzosin\par \par 1.30.2023\par increased urgency\par no incontinence\par postvoid dribbling\par urgency no incontinence\par nocturia x 1-3\par did not take ditropan\par \par sexual function terrible\par viagra works sufficient for SI; decreased ejaculate\par did not take cialis\par \par \par \par 2.23.23\par Meeting with patient and wife\par We discussed ,at length, his BPH and LUTS again, specifically minimally invasive procedure including urolofit and Rezum. After long discussion, patient is leaning toward Rezum in office. We discussed risks and benefits of the procedure. Risks include infection, bleeding, retrograde ejaculation, not improvement of LUTS. Patient understands these risks are very low but do exist. \par \par Patient understands that he will have a catheter after procedure for 3-5 days. He will initially need to continue medication. The goal is to be medication free, but he understands that he may not be able to get off medications and there is risk for need for additional procedures in the future. \par \par Plan:\par 1. Patient will decide if he wishes to proceed with Rezum\par 2. Schedule procedure\par 3. UA, UCx\par 4. Continue alfuzosin for now. \par \par 3.23.2023\par Rezum\par \par 6.5.2023\par returns afer rezym\par feels improved\par stream more consistent\par nocturia 1-2 ( had been 4)\par

## 2023-06-07 LAB — SURGICAL PATHOLOGY STUDY: SIGNIFICANT CHANGE UP

## 2023-06-19 ENCOUNTER — APPOINTMENT (OUTPATIENT)
Dept: UROLOGY | Facility: CLINIC | Age: 73
End: 2023-06-19

## 2023-07-06 ENCOUNTER — TRANSCRIPTION ENCOUNTER (OUTPATIENT)
Age: 73
End: 2023-07-06

## 2023-07-07 ENCOUNTER — TRANSCRIPTION ENCOUNTER (OUTPATIENT)
Age: 73
End: 2023-07-07

## 2023-07-17 ENCOUNTER — LABORATORY RESULT (OUTPATIENT)
Age: 73
End: 2023-07-17

## 2023-07-17 ENCOUNTER — APPOINTMENT (OUTPATIENT)
Dept: DERMATOLOGY | Facility: CLINIC | Age: 73
End: 2023-07-17
Payer: MEDICARE

## 2023-07-17 DIAGNOSIS — Z12.83 ENCOUNTER FOR SCREENING FOR MALIGNANT NEOPLASM OF SKIN: ICD-10-CM

## 2023-07-17 DIAGNOSIS — C44.91 BASAL CELL CARCINOMA OF SKIN, UNSPECIFIED: ICD-10-CM

## 2023-07-17 DIAGNOSIS — D48.5 NEOPLASM OF UNCERTAIN BEHAVIOR OF SKIN: ICD-10-CM

## 2023-07-17 PROCEDURE — 11102 TANGNTL BX SKIN SINGLE LES: CPT

## 2023-07-17 PROCEDURE — 11900 INJECT SKIN LESIONS </W 7: CPT | Mod: 59

## 2023-07-17 PROCEDURE — 99213 OFFICE O/P EST LOW 20 MIN: CPT | Mod: 25

## 2023-07-17 NOTE — PHYSICAL EXAM
[Alert] : alert [Oriented x 3] : ~L oriented x 3 [Well Nourished] : well nourished [Conjunctiva Non-injected] : conjunctiva non-injected [No Visual Lymphadenopathy] : no visual  lymphadenopathy [No Clubbing] : no clubbing [No Edema] : no edema [No Bromhidrosis] : no bromhidrosis [No Chromhidrosis] : no chromhidrosis [Full Body Skin Exam Performed] : performed [FreeTextEntry3] : white skin\par L medial chest at biopsy site firm pink scar \par grey blue macule right parietal scalp

## 2023-07-17 NOTE — HISTORY OF PRESENT ILLNESS
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

## 2023-07-25 ENCOUNTER — NON-APPOINTMENT (OUTPATIENT)
Age: 73
End: 2023-07-25

## 2023-07-26 ENCOUNTER — NON-APPOINTMENT (OUTPATIENT)
Age: 73
End: 2023-07-26

## 2023-08-03 ENCOUNTER — APPOINTMENT (OUTPATIENT)
Dept: DERMATOLOGY | Facility: CLINIC | Age: 73
End: 2023-08-03
Payer: MEDICARE

## 2023-08-03 DIAGNOSIS — D23.9 OTHER BENIGN NEOPLASM OF SKIN, UNSPECIFIED: ICD-10-CM

## 2023-08-03 PROCEDURE — 11900 INJECT SKIN LESIONS </W 7: CPT

## 2023-09-13 ENCOUNTER — NON-APPOINTMENT (OUTPATIENT)
Age: 73
End: 2023-09-13

## 2023-10-02 ENCOUNTER — APPOINTMENT (OUTPATIENT)
Dept: DERMATOLOGY | Facility: CLINIC | Age: 73
End: 2023-10-02
Payer: MEDICARE

## 2023-10-02 DIAGNOSIS — L82.0 INFLAMED SEBORRHEIC KERATOSIS: ICD-10-CM

## 2023-10-02 DIAGNOSIS — L91.0 HYPERTROPHIC SCAR: ICD-10-CM

## 2023-10-02 PROCEDURE — 17110 DESTRUCTION B9 LES UP TO 14: CPT

## 2023-10-02 PROCEDURE — 11900 INJECT SKIN LESIONS </W 7: CPT | Mod: 59

## 2023-12-11 ENCOUNTER — APPOINTMENT (OUTPATIENT)
Dept: UROLOGY | Facility: CLINIC | Age: 73
End: 2023-12-11
Payer: MEDICARE

## 2023-12-11 ENCOUNTER — LABORATORY RESULT (OUTPATIENT)
Age: 73
End: 2023-12-11

## 2023-12-11 VITALS
SYSTOLIC BLOOD PRESSURE: 131 MMHG | HEART RATE: 80 BPM | TEMPERATURE: 97.6 F | OXYGEN SATURATION: 99 % | DIASTOLIC BLOOD PRESSURE: 76 MMHG

## 2023-12-11 DIAGNOSIS — R39.15 URGENCY OF URINATION: ICD-10-CM

## 2023-12-11 DIAGNOSIS — F52.4 PREMATURE EJACULATION: ICD-10-CM

## 2023-12-11 DIAGNOSIS — Z12.5 ENCOUNTER FOR SCREENING FOR MALIGNANT NEOPLASM OF PROSTATE: ICD-10-CM

## 2023-12-11 PROCEDURE — 51798 US URINE CAPACITY MEASURE: CPT

## 2023-12-11 PROCEDURE — 99214 OFFICE O/P EST MOD 30 MIN: CPT

## 2023-12-11 PROCEDURE — 51741 ELECTRO-UROFLOWMETRY FIRST: CPT

## 2023-12-11 RX ORDER — ALFUZOSIN HYDROCHLORIDE 10 MG/1
TABLET, EXTENDED RELEASE ORAL
Refills: 0 | Status: ACTIVE | COMMUNITY

## 2023-12-11 RX ORDER — PAROXETINE HYDROCHLORIDE 10 MG/1
10 TABLET, FILM COATED ORAL
Qty: 30 | Refills: 2 | Status: ACTIVE | COMMUNITY
Start: 2023-12-11 | End: 1900-01-01

## 2023-12-11 RX ORDER — FINASTERIDE 5 MG/1
5 TABLET, FILM COATED ORAL
Qty: 90 | Refills: 0 | Status: ACTIVE | COMMUNITY
Start: 2017-08-18 | End: 1900-01-01

## 2023-12-12 ENCOUNTER — TRANSCRIPTION ENCOUNTER (OUTPATIENT)
Age: 73
End: 2023-12-12

## 2024-01-17 NOTE — ASU DISCHARGE PLAN (ADULT/PEDIATRIC) - CLICK TO LAUNCH ORM
Take Tylenol as needed for pain or fever.  Follow-up with your primary physician and OB/GYN as discussed.  Return to the emergency department if increased difficulty breathing, worsening cough, or other new symptoms develop.   .

## 2024-02-11 ENCOUNTER — NON-APPOINTMENT (OUTPATIENT)
Age: 74
End: 2024-02-11

## 2024-02-12 ENCOUNTER — TRANSCRIPTION ENCOUNTER (OUTPATIENT)
Age: 74
End: 2024-02-12

## 2024-02-12 RX ORDER — NIRMATRELVIR AND RITONAVIR 300-100 MG
20 X 150 MG & KIT ORAL
Qty: 1 | Refills: 0 | Status: ACTIVE | COMMUNITY
Start: 2024-02-12 | End: 1900-01-01

## 2024-03-09 NOTE — ASSESSMENT
[FreeTextEntry1] : Elevated PSA (790.93) (R97.20)\par Difficulty urinating (788.99) (R39.198)\par Abnormal prostate by palpation (602.9) (N42.9)\par Erectile dysfunction (607.84) (N52.9)\par Urinary urgency (788.63) (R39.15)\par \par Urinary symptoms improved on Uroxatral and Finasteride\par Patient however complains of the effect on sexual function. He states that he is able to get an erection and have intercourse however he has no sense of enjoyment due to the poor quality of the ejaculate on Uroxatral and finasteride. We discussed proceeding with intervention such as UROLIFT and attempt to stop medications. He is reluctant to do this. We discussed the UROLIFT procedure.\par \par We discussed the utilization of Cialis 5 mg daily in attempt to treat his urinary and sexual dysfunction. He wished to proceed with this at this time.\par \par We discussed proceeding with a renal and bladder ultrasound.\par \par His PSA data was reviewed. He has had a reduction in PSA on finasteride. He is status post biopsy. He is status post MRIs and a 4K. We will repeat his PSA at this point. His examination is unremarkable.\par \par Elevated PSA \par .s/p TUSGbx\par s/p MRI x2\par s/p 4K\par Will repeat PSA\par discussed repeating PSA and or MRI\par continue to Monitor\par ,\par 1.8.2021\par labs reviewed\par PSA stable after finasteride\par s/p MRI \par s/p biopsy\par EXAM CHANGED with assymetry of (R lobe greated than left) recommend repeat MRI\par The [facility] expressed fully understanding of the information provided, the consequences and the management.\par \par LUTs \par low flow and PVR (flow nondiagnostic\par on maximal management\par interested in UROLIFT after COVID crisis\par patient has been given Informational materials given to patient.\par \par \par sexual function\par responds to to PD5 with excellent erection\par however not willing to take PD5\par couple is not interested in addressing\par \par \par 7.23.2021\par LUTS intiial ; flow 8 cc /sec voided volume 88\par then voided to PVR 29\par reviewed UROLIFT\par discussed indication\par will attempt tadalafil 5 mg daily\par stop alfuzosin\par \par erectile dysfunction\par not willing to take PD5 prn\par discussed daily tadalafil for LUTS and ED\par will repeat endocrine\par \par PSA will repeat\par \par testicular discomfort\par epididymal tenderness\par We discussed conservative management with: a. nonsteroidal anti-inflammatories, b. ice prn and c. scrotal support.\par \par \par 2/1/2022\par \par Patient returns with urinary symptoms. His flow is low however the volume is nondiagnostic. He has a small postvoid residual. He complains of some urgency. We previously discussed proceeding with a UroLift procedure. He is reluctant to proceed with any intervention. He is not happy with the retreatment rate reported for UroLift. We discussed Rezum. Previously we had discussed utilizing Vesicare. Apparently he did not proceed with this. We discussed attempting Myrbetriq however he was hypertensive and has had episodes of elevated blood pressure. We will initiate therapy with Ditropan XL 5 mg. We discussed the risk of urinary retention and side effects.\par Eliminate caffeinated products\par 1 coffee and several cola's per day\par \par He discussed complains of left testicular discomfort. It is not severe. It is a gnawing feeling. There is no hernia. His examination is unremarkable. His left epididymis is slightly tender. But this is within normal limits. We discussed nonsteroidal anti-inflammatories. We discussed the need to avoid self examination. We will proceed with a scrotal ultrasound\par \par His sexual dysfunction continues to be a problem. He is increased the dose of sildenafil to 40 mg. He is not does progressed beyond this. He states he is able to have intercourse with a soft penis. He experiences premature ejaculation due to his frustration. He states that he is avoiding sexual activity and feels this is sad. We discussed dose escalation.\par \par He has an abnormal prostate exam. He will undergo a repeat PSA. He is status post MRI and biopsy.\par \par Plan\par .1 creatinine, PSA\par 2. urinalysis\par 3. conservative management\par 4. ditropan Xl 5 mg\par 5. fu 4 weeks\par \par 1.30.2023\par LUTS \par ED \par Sexual dysfunction\par \par PSA is stable and MRI done in 2021\par Exam unchanged\par \par I discussed with [facility] the possible indications for treatment of prostatic obstruction including:\par 1 urinary symptoms and quality of life issues\par 2. Obstruction and urinary retention\par 3. Infections\par 4. Bladder stones\par 5. Upper tract changes including hydronephrosis and renal dysfunction\par \par We had an extensive discussion about treatment options\par We discussed surgical and minimally invasive approaches UROLIFT and Rezume\par We discussed medical management with alpha blocking agents (alfuzosin, tamsulosin doxazosin etc.)\par We discussed continuing finasteride to his regimen\par We discussed the impact of on PSA of finasteride\par HE is concerned re impact of finasteride on sexual function\par \par DIscussed anticholinergics or vibegron\par patient is unwilling to proceed with additional medical managed\par discussed cysto to evaluation\par \par Plan:\par 1. PSA\par 2. UA\par 3. urine culture\par 4. op cystoscopy\par Patient wants to proceed,\par \par 2.23.23\par Meeting with patient and wife\par We discussed ,at length, his BPH and LUTS again, specifically minimally invasive procedure including urolofit and Rezum. After long discussion, patient is leaning toward Rezum in office. We discussed risks and benefits of the procedure. Risks include infection, bleeding, retrograde ejaculation, not improvement of LUTS. Patient understands these risks are very low but do exist. \par \par Patient understands that he will have a catheter after procedure for 3-5 days. He will initially need to continue medication. The goal is to be medication free, but he understands that he may not be able to get off medications and there is risk for need for additional procedures in the future. \par \par Plan:\par 1. Patient will decide if he wishes to proceed with Rezum\par 2. Schedule procedure\par 3. UA, UCx\par 4. Continue alfuzosin for now  \par \par 6.5.2023\par Patient returns after Rezum procedure.  His postvoid residual has been 77 and now is 22.  He notes improved urinary stream with more consistent urinary stream.  He notes decreased nocturia from 3-4 up to 1-2 times per night.\par \par He continues to complain of sexual dysfunction although he has not been sexually active since the time of his procedure.  We discussed his previous complaints regarding sexual function which include decreased ejaculate volume with decreased rigidity and decreased interest in sexual activity.  Testosterone levels have been checked and levels were normal in the past.\par \par We discussed the cessation of both his alfuzosin and his finasteride.  He is interested in stopping the finasteride since he is concerned that this has an adverse effect on his sexuality.\par \par Plan:\par 1. stop alfuzson\par 2. stop finasteride if no change in symptoms\par 3. repeat PSA in 3 months\par 4. followup in 6 months\par 5. resume viagra  independent

## 2024-03-11 ENCOUNTER — APPOINTMENT (OUTPATIENT)
Dept: UROLOGY | Facility: CLINIC | Age: 74
End: 2024-03-11

## 2024-03-11 ENCOUNTER — TRANSCRIPTION ENCOUNTER (OUTPATIENT)
Age: 74
End: 2024-03-11

## 2024-03-11 ENCOUNTER — RX RENEWAL (OUTPATIENT)
Age: 74
End: 2024-03-11

## 2024-03-11 DIAGNOSIS — N52.9 MALE ERECTILE DYSFUNCTION, UNSPECIFIED: ICD-10-CM

## 2024-03-11 DIAGNOSIS — N42.9 DISORDER OF PROSTATE, UNSPECIFIED: ICD-10-CM

## 2024-03-11 DIAGNOSIS — R68.82 DECREASED LIBIDO: ICD-10-CM

## 2024-03-11 DIAGNOSIS — R39.198 OTHER DIFFICULTIES WITH MICTURITION: ICD-10-CM

## 2024-03-11 RX ORDER — SIMVASTATIN 20 MG/1
20 TABLET, FILM COATED ORAL
Qty: 90 | Refills: 1 | Status: ACTIVE | COMMUNITY
Start: 2023-09-14 | End: 1900-01-01

## 2024-03-11 NOTE — ASSESSMENT
[FreeTextEntry1] : Difficulty urinating (788.99) (R39.198)  Abnormal prostate by palpation (602.9) (N42.9)  Erectile dysfunction (607.84) (N52.9)  Urinary urgency (788.63) (R39.15)    Urinary symptoms improved on Uroxatral and Finasteride  Patient however complains of the effect on sexual function. He states that he is able to get an erection and have intercourse however he has no sense of enjoyment due to the poor quality of the ejaculate on Uroxatral and finasteride. We discussed proceeding with intervention such as UROLIFT and attempt to stop medications. He is reluctant to do this. We discussed the UROLIFT procedure.    We discussed the utilization of Cialis 5 mg daily in attempt to treat his urinary and sexual dysfunction. He wished to proceed with this at this time.    We discussed proceeding with a renal and bladder ultrasound.    His PSA data was reviewed. He has had a reduction in PSA on finasteride. He is status post biopsy. He is status post MRIs and a 4K. We will repeat his PSA at this point. His examination is unremarkable.    Elevated PSA  .s/p TUSGbx  s/p MRI x2  s/p 4K  Will repeat PSA  discussed repeating PSA and or MRI  continue to Monitor  ,  1.  labs reviewed  PSA stable after finasteride  s/p MRI  s/p biopsy  EXAM CHANGED with assymetry of (R lobe greated than left) recommend repeat MRI  The [facility] expressed fully understanding of the information provided, the consequences and the management.    LUTs  low flow and PVR (flow nondiagnostic  on maximal management  interested in UROLIFT after COVID crisis  patient has been given Informational materials given to patient.      sexual function  responds to to PD5 with excellent erection  however not willing to take PD5  couple is not interested in addressing      2021  LUTS intiial ; flow 8 cc /sec voided volume 88  then voided to PVR 29  reviewed UROLIFT  discussed indication  will attempt tadalafil 5 mg daily  stop alfuzosin    erectile dysfunction  not willing to take PD5 prn  discussed daily tadalafil for LUTS and ED  will repeat endocrine    PSA will repeat    testicular discomfort  epididymal tenderness  We discussed conservative management with: a. nonsteroidal anti-inflammatories, b. ice prn and c. scrotal support.      2022    Patient returns with urinary symptoms. His flow is low however the volume is nondiagnostic. He has a small postvoid residual. He complains of some urgency. We previously discussed proceeding with a UroLift procedure. He is reluctant to proceed with any intervention. He is not happy with the retreatment rate reported for UroLift. We discussed Rezum. Previously we had discussed utilizing Vesicare. Apparently he did not proceed with this. We discussed attempting Myrbetriq however he was hypertensive and has had episodes of elevated blood pressure. We will initiate therapy with Ditropan XL 5 mg. We discussed the risk of urinary retention and side effects.  Eliminate caffeinated products  1 coffee and several cola's per day    He discussed complains of left testicular discomfort. It is not severe. It is a gnawing feeling. There is no hernia. His examination is unremarkable. His left epididymis is slightly tender. But this is within normal limits. We discussed nonsteroidal anti-inflammatories. We discussed the need to avoid self examination. We will proceed with a scrotal ultrasound    His sexual dysfunction continues to be a problem. He is increased the dose of sildenafil to 40 mg. He is not does progressed beyond this. He states he is able to have intercourse with a soft penis. He experiences premature ejaculation due to his frustration. He states that he is avoiding sexual activity and feels this is sad. We discussed dose escalation.    He has an abnormal prostate exam. He will undergo a repeat PSA. He is status post MRI and biopsy.    Plan  .1 creatinine, PSA  2. urinalysis  3. conservative management  4. ditropan Xl 5 mg  5. fu 4 weeks    2023  LUTS  ED  Sexual dysfunction    PSA is stable and MRI done in   Exam unchanged    I discussed with [facility] the possible indications for treatment of prostatic obstruction includin urinary symptoms and quality of life issues  2. Obstruction and urinary retention  3. Infections  4. Bladder stones  5. Upper tract changes including hydronephrosis and renal dysfunction    We had an extensive discussion about treatment options  We discussed surgical and minimally invasive approaches UROLIFT and Rezume  We discussed medical management with alpha blocking agents (alfuzosin, tamsulosin doxazosin etc.)  We discussed continuing finasteride to his regimen  We discussed the impact of on PSA of finasteride  HE is concerned re impact of finasteride on sexual function    DIscussed anticholinergics or vibegron  patient is unwilling to proceed with additional medical managed  discussed cysto to evaluation    Plan:  1. PSA  2. UA  3. urine culture  4. op cystoscopy  Patient wants to proceed,    2.  Meeting with patient and wife  We discussed ,at length, his BPH and LUTS again, specifically minimally invasive procedure including urolofit and Rezum. After long discussion, patient is leaning toward Rezum in office. We discussed risks and benefits of the procedure. Risks include infection, bleeding, retrograde ejaculation, not improvement of LUTS. Patient understands these risks are very low but do exist.    Patient understands that he will have a catheter after procedure for 3-5 days. He will initially need to continue medication. The goal is to be medication free, but he understands that he may not be able to get off medications and there is risk for need for additional procedures in the future.    Plan:  1. Patient will decide if he wishes to proceed with Rezum  2. Schedule procedure  3. UA, UCx  4. Continue alfuzosin for now    .5  Patient returns after Rezum procedure. His postvoid residual has been 77 and now is 22. He notes improved urinary stream with more consistent urinary stream. He notes decreased nocturia from 3-4 up to 1-2 times per night.    He continues to complain of sexual dysfunction although he has not been sexually active since the time of his procedure. We discussed his previous complaints regarding sexual function which include decreased ejaculate volume with decreased rigidity and decreased interest in sexual activity. Testosterone levels have been checked and levels were normal in the past.    We discussed the cessation of both his alfuzosin and his finasteride. He is interested in stopping the finasteride since he is concerned that this has an adverse effect on his sexuality.    Plan:  1. stop alfuzson  2. stop finasteride if no change in symptoms  3. repeat PSA in 3 months  4. followup in 6 months  5. resume viagra.        Change patient returns after Rezum procedure. He started to wean himself from finasteride and Uroxatrol.  He did not feel that his urinary symptoms would allow him to do this he became symptomatic. He has much improved urgency and nocturia symptoms better after REZUM  That he does note that he has decreased nocturia.  His IPSS score is 7.  His for maximum flow is 16.1 cc/s.  In  was 8.0 cc/s.  His voided volume is 118 cc.  His postvoid residual was 38cc  Patient notes that he continues to be sexually active.  His wife is knee replacement and has been having medical issues.  They are infrequently sexually active.  He notes that he is having increasing difficulty with premature ejaculation.  He is attempted utilizing condoms as well as topical anesthetics.  He is not pleased with this.  We discussed the management of premature ejaculation.    PREMATURE EJACULATION The patient and I discussed the guidelines for the treatment of premature ejaculation.  New onset Patient endorses anxiety about sexual function. The patient reports that his symptoms cause him significant distress. First line treatment includes stress reduction, behaviour modification techniques and "squeeze technique".  Attempted and not effective  We discussed the use of topical anesthetics (including Lidocaine / Prilocaine cream 2.5gm) 20-30 minutes prior to intercourse. Has attempted and not pleased  Second line therapy commonly includes SSRI's. Examples: Fluoxetine 5-20mg/day; Paroxetine 10, 20, or 40mg/day or 20mg 3-4 hours prior to intercourse; Zoloft  mg/day or 50mg 4-8 hours prior to intercourse; Clomipramine 25-50mg/day. The major side effects of these medications is nausea, dry mouth, drowsiness and decreased libido. The patient is not on MAOi, lithium, Sumatriptan, anticonvulsants, benzo's, cimetidine, TCA's, antipsychotics, antiarrhythmics, or coumadin. He understands that the side effects have primarily been assessed in patients with depression but it is thought that the AE's are similar in men with premature ejaculation.  Given the patient's degree of bother, medical history and current medications, I recommended treatment with *** He decided to try ***We discussed the utilization of Paxil 10 mg as needed versus that on a daily basis.  Side effects of medications were discussed.   patient wants PSA re prostate cancer screening  Plan: 1.  Paxil 10 mg as needed 2.  Renewal Uroxatrol 3.  Renewal finasteride 3.  Follow-up in 6 to 8 weeks to assess response to medications 4. finasteride

## 2024-03-11 NOTE — HISTORY OF PRESENT ILLNESS
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 He notes marked improvement in urinary urgency. He still notes decreased urinary stream. He feels the libido has diminished since on finasteride; although, acknowledges diminished libido prior to starting.   8.6.2018 MRI no evidence of areas concern 4K low risk complaining of a penile curvature for the last six months ? worsened recently does not preclude SI not painful libido has diminished; had preceded the implementation of finasteride low ejaculate volume since alfuzosin continues to be sexually active but less frequent Urinary symptoms improved on finasteride   Patient is currently experiencing weak stream, but no urinary incontinence and no urinary urgency   The patient presents with complaints of nocturia (once (marked improvement)).  The patient presents with complaints of erectile dysfunction (able to achieve intercourse; erections 7/10. curvature 30 %; ? change over last 6 months)    1.25.2019 patient returns for DUS he states that penile curvature preclude sexual intercourse he states that at the point of curvature there is penile instability he has avoided sexual intercourse   1.31.2020 sexual dysfunction takes viagra it works but "annoying" to have to take medication low ejaculate volume on alfuzosin "sex less pleasurable"  urinary symptoms; IPSS 13 slow stream in AM before takes medication during day does well urinary retention after ear surgery  1.8.2021 patient not having sexual intercourse  "agreement"  did not enjoy having taking medication  IPSS 10 ? dribbling on afluzosin and finasteride  7.23.2021 complaining of mild left testicular discomfor tx 3 weeks mild gnawing no swelling erythema etc occasional masturbation no sexual activity poor quality erection not sufficient for sexual intercourse continues on alfuzosin and finasteride  2.1.2022 testicular discomfort persists sexual function penetrates with poor erection; did not respond to daily cialis started sildenafil 40 mg did not improve experience premature ejaculation due to anxiety relating to performance decreased flow on finasteride and alfuzosin  1.30.2023 increased urgency no incontinence postvoid dribbling urgency no incontinence nocturia x 1-3 did not take ditropan  sexual function terrible viagra works sufficient for SI; decreased ejaculate did not take cialis    2.23.23 Meeting with patient and wife We discussed ,at length, his BPH and LUTS again, specifically minimally invasive procedure including urolofit and Rezum. After long discussion, patient is leaning toward Rezum in office. We discussed risks and benefits of the procedure. Risks include infection, bleeding, retrograde ejaculation, not improvement of LUTS. Patient understands these risks are very low but do exist.   Patient understands that he will have a catheter after procedure for 3-5 days. He will initially need to continue medication. The goal is to be medication free, but he understands that he may not be able to get off medications and there is risk for need for additional procedures in the future.   Plan: 1. Patient will decide if he wishes to proceed with Rezum 2. Schedule procedure 3. UA, UCx 4. Continue alfuzosin for now.   3.23.2023 Rezum  6.5.2023 returns afer rezym feels improved stream more consistent nocturia 1-2 ( had been 4)  12.11.2023 attempted to stop finasteride and felt symptoms worsened  then stopped alfuzosin  when off symptoms were not great on both alfuzosin and finasteride  nocturia x 1  currently on both some improvement with decreased nocturia less sexually active since wife had been ill responds to Viagra new onset up premture ejaculation

## 2024-04-01 ENCOUNTER — APPOINTMENT (OUTPATIENT)
Dept: DERMATOLOGY | Facility: CLINIC | Age: 74
End: 2024-04-01
Payer: MEDICARE

## 2024-04-01 DIAGNOSIS — L71.9 ROSACEA, UNSPECIFIED: ICD-10-CM

## 2024-04-01 DIAGNOSIS — L30.9 DERMATITIS, UNSPECIFIED: ICD-10-CM

## 2024-04-01 PROCEDURE — 99214 OFFICE O/P EST MOD 30 MIN: CPT

## 2024-04-01 RX ORDER — HALOBETASOL PROPIONATE 0.5 MG/G
0.05 CREAM TOPICAL
Qty: 50 | Refills: 2 | Status: ACTIVE | COMMUNITY
Start: 2024-04-01 | End: 1900-01-01

## 2024-04-01 NOTE — HISTORY OF PRESENT ILLNESS
[FreeTextEntry1] : spot check  [de-identified] : last cbe July remote hx BCC L cheek, concerned because new spot on cheek  itchy rash comes and goes legs and hips  SH: retired MD,  of [employer]

## 2024-04-01 NOTE — PHYSICAL EXAM
[Alert] : alert [Oriented x 3] : ~L oriented x 3 [Well Nourished] : well nourished [Conjunctiva Non-injected] : conjunctiva non-injected [No Visual Lymphadenopathy] : no visual  lymphadenopathy [No Clubbing] : no clubbing [No Edema] : no edema [No Bromhidrosis] : no bromhidrosis [No Chromhidrosis] : no chromhidrosis [FreeTextEntry3] : right cheek pink and yellow resolving papule, no tele or pearliness edematous clustered pink papules L medial knee scattered scaly pink papules legs

## 2024-05-23 ENCOUNTER — TRANSCRIPTION ENCOUNTER (OUTPATIENT)
Age: 74
End: 2024-05-23

## 2024-06-19 NOTE — PRE-ANESTHESIA EVALUATION ADULT - BP NONINVASIVE SYSTOLIC (MM HG)
Established patient is here today presenting with foot health exam and calluses. Patient denies any other issues at this time.    LOV: 02/08/2024 - [facility]    Medications reviewed and updated.  Allergies verified.   Tobacco verified.   Pt is diabetic: Yes  Pt is insulin dependent: No  Pt is on blood thinners: No  Diabetic shoe rx: No    Last visit with PCP: 03/01/2024 Nina Leal MD    Hemoglobin A1C (%)   Date Value   03/01/2024 6.9 (H)           153 no acute change

## 2024-08-01 ENCOUNTER — APPOINTMENT (OUTPATIENT)
Dept: DERMATOLOGY | Facility: CLINIC | Age: 74
End: 2024-08-01

## 2024-08-14 ENCOUNTER — TRANSCRIPTION ENCOUNTER (OUTPATIENT)
Age: 74
End: 2024-08-14

## 2024-09-18 ENCOUNTER — RX RENEWAL (OUTPATIENT)
Age: 74
End: 2024-09-18

## 2024-11-13 ENCOUNTER — TRANSCRIPTION ENCOUNTER (OUTPATIENT)
Age: 74
End: 2024-11-13

## 2024-12-10 ENCOUNTER — APPOINTMENT (OUTPATIENT)
Dept: FAMILY MEDICINE | Facility: CLINIC | Age: 74
End: 2024-12-10
Payer: MEDICARE

## 2024-12-10 ENCOUNTER — NON-APPOINTMENT (OUTPATIENT)
Age: 74
End: 2024-12-10

## 2024-12-10 VITALS
OXYGEN SATURATION: 99 % | HEIGHT: 70 IN | WEIGHT: 155 LBS | BODY MASS INDEX: 22.19 KG/M2 | HEART RATE: 74 BPM | SYSTOLIC BLOOD PRESSURE: 122 MMHG | TEMPERATURE: 97.3 F | DIASTOLIC BLOOD PRESSURE: 70 MMHG

## 2024-12-10 DIAGNOSIS — Z23 ENCOUNTER FOR IMMUNIZATION: ICD-10-CM

## 2024-12-10 DIAGNOSIS — R55 SYNCOPE AND COLLAPSE: ICD-10-CM

## 2024-12-10 DIAGNOSIS — Z00.00 ENCOUNTER FOR GENERAL ADULT MEDICAL EXAMINATION W/OUT ABNORMAL FINDINGS: ICD-10-CM

## 2024-12-10 DIAGNOSIS — K21.9 GASTRO-ESOPHAGEAL REFLUX DISEASE W/OUT ESOPHAGITIS: ICD-10-CM

## 2024-12-10 PROCEDURE — G0009: CPT

## 2024-12-10 PROCEDURE — 99213 OFFICE O/P EST LOW 20 MIN: CPT | Mod: 25

## 2024-12-10 PROCEDURE — G0439: CPT

## 2024-12-10 PROCEDURE — 90677 PCV20 VACCINE IM: CPT

## 2024-12-10 PROCEDURE — 93000 ELECTROCARDIOGRAM COMPLETE: CPT

## 2024-12-10 RX ORDER — FAMOTIDINE 20 MG/1
20 TABLET, FILM COATED ORAL
Qty: 90 | Refills: 1 | Status: ACTIVE | COMMUNITY
Start: 2024-12-10 | End: 1900-01-01

## 2024-12-12 ENCOUNTER — NON-APPOINTMENT (OUTPATIENT)
Age: 74
End: 2024-12-12

## 2024-12-12 DIAGNOSIS — R79.89 OTHER SPECIFIED ABNORMAL FINDINGS OF BLOOD CHEMISTRY: ICD-10-CM

## 2024-12-12 LAB
25(OH)D3 SERPL-MCNC: 35.4 NG/ML
ALBUMIN SERPL ELPH-MCNC: 4.3 G/DL
ALP BLD-CCNC: 96 U/L
ALT SERPL-CCNC: 12 U/L
ANION GAP SERPL CALC-SCNC: 11 MMOL/L
APPEARANCE: CLEAR
AST SERPL-CCNC: 17 U/L
BACTERIA: NEGATIVE /HPF
BASOPHILS # BLD AUTO: 0.02 K/UL
BASOPHILS NFR BLD AUTO: 0.3 %
BILIRUB SERPL-MCNC: 0.7 MG/DL
BILIRUBIN URINE: NEGATIVE
BLOOD URINE: NEGATIVE
BUN SERPL-MCNC: 19 MG/DL
CALCIUM SERPL-MCNC: 9.6 MG/DL
CAST: 0 /LPF
CHLORIDE SERPL-SCNC: 103 MMOL/L
CHOLEST SERPL-MCNC: 179 MG/DL
CO2 SERPL-SCNC: 28 MMOL/L
COLOR: YELLOW
CREAT SERPL-MCNC: 1.17 MG/DL
EGFR: 65 ML/MIN/1.73M2
EOSINOPHIL # BLD AUTO: 0.4 K/UL
EOSINOPHIL NFR BLD AUTO: 5.3 %
EPITHELIAL CELLS: 0 /HPF
ESTIMATED AVERAGE GLUCOSE: 108 MG/DL
FOLATE SERPL-MCNC: 12.9 NG/ML
GLUCOSE QUALITATIVE U: NEGATIVE MG/DL
GLUCOSE SERPL-MCNC: 93 MG/DL
HBA1C MFR BLD HPLC: 5.4 %
HCT VFR BLD CALC: 46.5 %
HDLC SERPL-MCNC: 38 MG/DL
HGB BLD-MCNC: 14.8 G/DL
IMM GRANULOCYTES NFR BLD AUTO: 0.1 %
KETONES URINE: NEGATIVE MG/DL
LDLC SERPL CALC-MCNC: 111 MG/DL
LEUKOCYTE ESTERASE URINE: NEGATIVE
LYMPHOCYTES # BLD AUTO: 1.83 K/UL
LYMPHOCYTES NFR BLD AUTO: 24.2 %
MAN DIFF?: NORMAL
MCHC RBC-ENTMCNC: 29.5 PG
MCHC RBC-ENTMCNC: 31.8 G/DL
MCV RBC AUTO: 92.8 FL
MICROSCOPIC-UA: NORMAL
MONOCYTES # BLD AUTO: 0.5 K/UL
MONOCYTES NFR BLD AUTO: 6.6 %
NEUTROPHILS # BLD AUTO: 4.81 K/UL
NEUTROPHILS NFR BLD AUTO: 63.5 %
NITRITE URINE: NEGATIVE
NONHDLC SERPL-MCNC: 141 MG/DL
PH URINE: 6.5
PLATELET # BLD AUTO: 217 K/UL
POTASSIUM SERPL-SCNC: 4.5 MMOL/L
PROT SERPL-MCNC: 6.4 G/DL
PROTEIN URINE: NEGATIVE MG/DL
PSA SERPL-MCNC: 3.63 NG/ML
RBC # BLD: 5.01 M/UL
RBC # FLD: 12.6 %
RED BLOOD CELLS URINE: 0 /HPF
SODIUM SERPL-SCNC: 141 MMOL/L
SPECIFIC GRAVITY URINE: 1.03
T4 FREE SERPL-MCNC: 1.3 NG/DL
TRIGL SERPL-MCNC: 170 MG/DL
TSH SERPL-ACNC: 4.35 UIU/ML
UROBILINOGEN URINE: 0.2 MG/DL
VIT B12 SERPL-MCNC: 464 PG/ML
WBC # FLD AUTO: 7.57 K/UL
WHITE BLOOD CELLS URINE: 0 /HPF

## 2024-12-13 ENCOUNTER — APPOINTMENT (OUTPATIENT)
Dept: HEART AND VASCULAR | Facility: CLINIC | Age: 74
End: 2024-12-13
Payer: MEDICARE

## 2024-12-13 VITALS
TEMPERATURE: 97.3 F | DIASTOLIC BLOOD PRESSURE: 76 MMHG | WEIGHT: 155 LBS | HEART RATE: 60 BPM | SYSTOLIC BLOOD PRESSURE: 130 MMHG | HEIGHT: 70 IN | OXYGEN SATURATION: 99 % | BODY MASS INDEX: 22.19 KG/M2

## 2024-12-13 DIAGNOSIS — M48.9 SPONDYLOPATHY, UNSPECIFIED: ICD-10-CM

## 2024-12-13 PROCEDURE — 93000 ELECTROCARDIOGRAM COMPLETE: CPT

## 2024-12-13 PROCEDURE — 99214 OFFICE O/P EST MOD 30 MIN: CPT

## 2024-12-13 PROCEDURE — G2211 COMPLEX E/M VISIT ADD ON: CPT

## 2024-12-16 ENCOUNTER — APPOINTMENT (OUTPATIENT)
Dept: HEART AND VASCULAR | Facility: CLINIC | Age: 74
End: 2024-12-16
Payer: MEDICARE

## 2024-12-16 ENCOUNTER — APPOINTMENT (OUTPATIENT)
Dept: RADIOLOGY | Facility: CLINIC | Age: 74
End: 2024-12-16
Payer: MEDICARE

## 2024-12-16 ENCOUNTER — RX RENEWAL (OUTPATIENT)
Age: 74
End: 2024-12-16

## 2024-12-16 ENCOUNTER — APPOINTMENT (OUTPATIENT)
Dept: PHYSICAL MEDICINE AND REHAB | Facility: CLINIC | Age: 74
End: 2024-12-16
Payer: MEDICARE

## 2024-12-16 ENCOUNTER — OUTPATIENT (OUTPATIENT)
Dept: OUTPATIENT SERVICES | Facility: HOSPITAL | Age: 74
LOS: 1 days | End: 2024-12-16

## 2024-12-16 VITALS
HEIGHT: 70 IN | HEART RATE: 62 BPM | BODY MASS INDEX: 22.19 KG/M2 | SYSTOLIC BLOOD PRESSURE: 116 MMHG | DIASTOLIC BLOOD PRESSURE: 69 MMHG | OXYGEN SATURATION: 97 % | WEIGHT: 155 LBS

## 2024-12-16 VITALS
HEART RATE: 82 BPM | OXYGEN SATURATION: 97 % | SYSTOLIC BLOOD PRESSURE: 119 MMHG | HEIGHT: 70 IN | TEMPERATURE: 97.5 F | WEIGHT: 155.25 LBS | DIASTOLIC BLOOD PRESSURE: 67 MMHG | BODY MASS INDEX: 22.23 KG/M2

## 2024-12-16 DIAGNOSIS — R07.9 CHEST PAIN, UNSPECIFIED: ICD-10-CM

## 2024-12-16 DIAGNOSIS — Z87.2 PERSONAL HISTORY OF DISEASES OF THE SKIN AND SUBCUTANEOUS TISSUE: Chronic | ICD-10-CM

## 2024-12-16 DIAGNOSIS — K21.9 GASTRO-ESOPHAGEAL REFLUX DISEASE WITHOUT ESOPHAGITIS: Chronic | ICD-10-CM

## 2024-12-16 DIAGNOSIS — N40.0 BENIGN PROSTATIC HYPERPLASIA WITHOUT LOWER URINARY TRACT SYMPTOMS: Chronic | ICD-10-CM

## 2024-12-16 DIAGNOSIS — E78.1 PURE HYPERGLYCERIDEMIA: ICD-10-CM

## 2024-12-16 DIAGNOSIS — M48.061 SPINAL STENOSIS, LUMBAR REGION WITHOUT NEUROGENIC CLAUDICATION: ICD-10-CM

## 2024-12-16 DIAGNOSIS — Z90.89 ACQUIRED ABSENCE OF OTHER ORGANS: Chronic | ICD-10-CM

## 2024-12-16 DIAGNOSIS — R94.31 ABNORMAL ELECTROCARDIOGRAM [ECG] [EKG]: ICD-10-CM

## 2024-12-16 PROCEDURE — 99205 OFFICE O/P NEW HI 60 MIN: CPT

## 2024-12-16 PROCEDURE — 93351 STRESS TTE COMPLETE: CPT

## 2024-12-16 PROCEDURE — 99214 OFFICE O/P EST MOD 30 MIN: CPT

## 2024-12-16 PROCEDURE — G2211 COMPLEX E/M VISIT ADD ON: CPT

## 2024-12-16 PROCEDURE — 72110 X-RAY EXAM L-2 SPINE 4/>VWS: CPT | Mod: 26

## 2024-12-16 RX ORDER — METHOCARBAMOL 750 MG/1
750 TABLET, FILM COATED ORAL EVERY 6 HOURS
Qty: 120 | Refills: 2 | Status: ACTIVE | COMMUNITY
Start: 2024-12-16 | End: 1900-01-01

## 2024-12-18 ENCOUNTER — APPOINTMENT (OUTPATIENT)
Dept: GASTROENTEROLOGY | Facility: CLINIC | Age: 74
End: 2024-12-18

## 2024-12-19 ENCOUNTER — TRANSCRIPTION ENCOUNTER (OUTPATIENT)
Age: 74
End: 2024-12-19

## 2025-01-06 ENCOUNTER — APPOINTMENT (OUTPATIENT)
Dept: MRI IMAGING | Facility: CLINIC | Age: 75
End: 2025-01-06
Payer: MEDICARE

## 2025-01-06 ENCOUNTER — OUTPATIENT (OUTPATIENT)
Dept: OUTPATIENT SERVICES | Facility: HOSPITAL | Age: 75
LOS: 1 days | End: 2025-01-06

## 2025-01-06 DIAGNOSIS — Z87.2 PERSONAL HISTORY OF DISEASES OF THE SKIN AND SUBCUTANEOUS TISSUE: Chronic | ICD-10-CM

## 2025-01-06 DIAGNOSIS — K21.9 GASTRO-ESOPHAGEAL REFLUX DISEASE WITHOUT ESOPHAGITIS: Chronic | ICD-10-CM

## 2025-01-06 DIAGNOSIS — Z90.89 ACQUIRED ABSENCE OF OTHER ORGANS: Chronic | ICD-10-CM

## 2025-01-06 DIAGNOSIS — Z86.39 PERSONAL HISTORY OF OTHER ENDOCRINE, NUTRITIONAL AND METABOLIC DISEASE: Chronic | ICD-10-CM

## 2025-01-06 DIAGNOSIS — Z98.890 OTHER SPECIFIED POSTPROCEDURAL STATES: Chronic | ICD-10-CM

## 2025-01-06 DIAGNOSIS — N40.0 BENIGN PROSTATIC HYPERPLASIA WITHOUT LOWER URINARY TRACT SYMPTOMS: Chronic | ICD-10-CM

## 2025-01-06 PROCEDURE — 72148 MRI LUMBAR SPINE W/O DYE: CPT | Mod: 26,MH

## 2025-01-27 ENCOUNTER — APPOINTMENT (OUTPATIENT)
Dept: UROLOGY | Facility: CLINIC | Age: 75
End: 2025-01-27
Payer: MEDICARE

## 2025-01-27 DIAGNOSIS — R39.198 OTHER DIFFICULTIES WITH MICTURITION: ICD-10-CM

## 2025-01-27 DIAGNOSIS — Z12.5 ENCOUNTER FOR SCREENING FOR MALIGNANT NEOPLASM OF PROSTATE: ICD-10-CM

## 2025-01-27 DIAGNOSIS — N52.9 MALE ERECTILE DYSFUNCTION, UNSPECIFIED: ICD-10-CM

## 2025-01-27 DIAGNOSIS — N42.9 DISORDER OF PROSTATE, UNSPECIFIED: ICD-10-CM

## 2025-01-27 DIAGNOSIS — F52.4 PREMATURE EJACULATION: ICD-10-CM

## 2025-01-27 PROCEDURE — 51798 US URINE CAPACITY MEASURE: CPT

## 2025-01-27 PROCEDURE — 51741 ELECTRO-UROFLOWMETRY FIRST: CPT

## 2025-01-27 PROCEDURE — 99214 OFFICE O/P EST MOD 30 MIN: CPT

## 2025-01-27 RX ORDER — SILDENAFIL 50 MG/1
50 TABLET ORAL
Qty: 30 | Refills: 3 | Status: ACTIVE | COMMUNITY
Start: 2025-01-27 | End: 1900-01-01

## 2025-01-28 DIAGNOSIS — R97.20 ELEVATED PROSTATE, SPECIFIC ANTIGEN [PSA]: ICD-10-CM

## 2025-01-28 LAB
ALBUMIN SERPL ELPH-MCNC: 4.5 G/DL
ALP BLD-CCNC: 102 U/L
ALT SERPL-CCNC: 13 U/L
ANION GAP SERPL CALC-SCNC: 10 MMOL/L
APPEARANCE: CLEAR
AST SERPL-CCNC: 20 U/L
BILIRUB SERPL-MCNC: 0.3 MG/DL
BILIRUBIN URINE: NEGATIVE
BLOOD URINE: NEGATIVE
BUN SERPL-MCNC: 15 MG/DL
CALCIUM SERPL-MCNC: 9.2 MG/DL
CHLORIDE SERPL-SCNC: 104 MMOL/L
CO2 SERPL-SCNC: 27 MMOL/L
COLOR: NORMAL
CREAT SERPL-MCNC: 1.09 MG/DL
EGFR: 70 ML/MIN/1.73M2
GLUCOSE QUALITATIVE U: NEGATIVE MG/DL
GLUCOSE SERPL-MCNC: 95 MG/DL
KETONES URINE: ABNORMAL MG/DL
LEUKOCYTE ESTERASE URINE: NEGATIVE
NITRITE URINE: NEGATIVE
PH URINE: 5.5
POTASSIUM SERPL-SCNC: 4.9 MMOL/L
PROT SERPL-MCNC: 6.4 G/DL
PROTEIN URINE: NEGATIVE MG/DL
PSA SERPL-MCNC: 4.67 NG/ML
SODIUM SERPL-SCNC: 140 MMOL/L
SPECIFIC GRAVITY URINE: 1.02
UROBILINOGEN URINE: 0.2 MG/DL

## 2025-02-04 ENCOUNTER — APPOINTMENT (OUTPATIENT)
Dept: PHYSICAL MEDICINE AND REHAB | Facility: CLINIC | Age: 75
End: 2025-02-04
Payer: MEDICARE

## 2025-02-04 VITALS
DIASTOLIC BLOOD PRESSURE: 74 MMHG | HEIGHT: 70 IN | WEIGHT: 155 LBS | BODY MASS INDEX: 22.19 KG/M2 | HEART RATE: 55 BPM | OXYGEN SATURATION: 98 % | SYSTOLIC BLOOD PRESSURE: 117 MMHG

## 2025-02-04 DIAGNOSIS — M48.061 SPINAL STENOSIS, LUMBAR REGION WITHOUT NEUROGENIC CLAUDICATION: ICD-10-CM

## 2025-02-04 PROCEDURE — 99214 OFFICE O/P EST MOD 30 MIN: CPT

## 2025-02-05 ENCOUNTER — APPOINTMENT (OUTPATIENT)
Dept: VASCULAR SURGERY | Facility: CLINIC | Age: 75
End: 2025-02-05
Payer: MEDICARE

## 2025-02-05 VITALS
HEIGHT: 70 IN | WEIGHT: 155 LBS | HEART RATE: 76 BPM | TEMPERATURE: 97.2 F | SYSTOLIC BLOOD PRESSURE: 110 MMHG | BODY MASS INDEX: 22.19 KG/M2 | DIASTOLIC BLOOD PRESSURE: 67 MMHG | OXYGEN SATURATION: 97 %

## 2025-02-05 DIAGNOSIS — I83.893 VARICOSE VEINS OF BILATERAL LOWER EXTREMITIES WITH OTHER COMPLICATIONS: ICD-10-CM

## 2025-02-05 DIAGNOSIS — M79.604 PAIN IN RIGHT LEG: ICD-10-CM

## 2025-02-05 DIAGNOSIS — I83.811 VARICOSE VEINS OF RIGHT LOWER EXTREMITY WITH PAIN: ICD-10-CM

## 2025-02-05 DIAGNOSIS — R25.2 CRAMP AND SPASM: ICD-10-CM

## 2025-02-05 PROCEDURE — 99203 OFFICE O/P NEW LOW 30 MIN: CPT | Mod: 25

## 2025-02-05 PROCEDURE — 93970 EXTREMITY STUDY: CPT

## 2025-02-12 ENCOUNTER — LABORATORY RESULT (OUTPATIENT)
Age: 75
End: 2025-02-12

## 2025-02-13 ENCOUNTER — TRANSCRIPTION ENCOUNTER (OUTPATIENT)
Age: 75
End: 2025-02-13

## 2025-02-17 ENCOUNTER — TRANSCRIPTION ENCOUNTER (OUTPATIENT)
Age: 75
End: 2025-02-17

## 2025-02-19 ENCOUNTER — NON-APPOINTMENT (OUTPATIENT)
Age: 75
End: 2025-02-19

## 2025-03-04 ENCOUNTER — APPOINTMENT (OUTPATIENT)
Dept: PHYSICAL MEDICINE AND REHAB | Facility: CLINIC | Age: 75
End: 2025-03-04
Payer: MEDICARE

## 2025-03-04 VITALS
BODY MASS INDEX: 22.19 KG/M2 | HEIGHT: 70 IN | DIASTOLIC BLOOD PRESSURE: 66 MMHG | WEIGHT: 155 LBS | HEART RATE: 58 BPM | OXYGEN SATURATION: 98 % | SYSTOLIC BLOOD PRESSURE: 110 MMHG

## 2025-03-04 DIAGNOSIS — M47.819 SPONDYLOSIS W/OUT MYELOPATHY OR RADICULOPATHY, SITE UNSPECIFIED: ICD-10-CM

## 2025-03-04 DIAGNOSIS — M47.816 SPONDYLOSIS W/OUT MYELOPATHY OR RADICULOPATHY, LUMBAR REGION: ICD-10-CM

## 2025-03-04 DIAGNOSIS — Z71.85 ENCOUNTER FOR IMMUNIZATION SAFETY COUNSELING: ICD-10-CM

## 2025-03-04 PROCEDURE — 99214 OFFICE O/P EST MOD 30 MIN: CPT

## 2025-03-06 ENCOUNTER — TRANSCRIPTION ENCOUNTER (OUTPATIENT)
Age: 75
End: 2025-03-06

## 2025-03-10 ENCOUNTER — NON-APPOINTMENT (OUTPATIENT)
Age: 75
End: 2025-03-10

## 2025-03-10 LAB
MEV IGG FLD QL IA: >300 AU/ML
MEV IGG+IGM SER-IMP: POSITIVE
MUV AB SER-ACNC: POSITIVE
MUV IGG SER QL IA: 273 AU/ML
RUBV IGG FLD-ACNC: 24.4 INDEX
RUBV IGG SER-IMP: POSITIVE
VZV AB TITR SER: POSITIVE
VZV IGG SER IF-ACNC: 24.4 S/CO

## 2025-03-19 ENCOUNTER — APPOINTMENT (OUTPATIENT)
Age: 75
End: 2025-03-19
Payer: MEDICARE

## 2025-03-19 PROCEDURE — 64491 INJ PARAVERT F JNT C/T 2 LEV: CPT | Mod: 50

## 2025-03-19 PROCEDURE — 64490 INJ PARAVERT F JNT C/T 1 LEV: CPT | Mod: 50

## 2025-04-01 ENCOUNTER — APPOINTMENT (OUTPATIENT)
Dept: PHYSICAL MEDICINE AND REHAB | Facility: CLINIC | Age: 75
End: 2025-04-01
Payer: MEDICARE

## 2025-04-01 VITALS
WEIGHT: 155 LBS | HEART RATE: 62 BPM | HEIGHT: 70 IN | SYSTOLIC BLOOD PRESSURE: 105 MMHG | DIASTOLIC BLOOD PRESSURE: 65 MMHG | OXYGEN SATURATION: 97 % | BODY MASS INDEX: 22.19 KG/M2

## 2025-04-01 DIAGNOSIS — M53.3 SACROCOCCYGEAL DISORDERS, NOT ELSEWHERE CLASSIFIED: ICD-10-CM

## 2025-04-01 PROCEDURE — 99214 OFFICE O/P EST MOD 30 MIN: CPT

## 2025-04-25 ENCOUNTER — APPOINTMENT (OUTPATIENT)
Dept: PHYSICAL MEDICINE AND REHAB | Facility: CLINIC | Age: 75
End: 2025-04-25

## 2025-05-05 ENCOUNTER — NON-APPOINTMENT (OUTPATIENT)
Age: 75
End: 2025-05-05

## 2025-05-05 ENCOUNTER — OUTPATIENT (OUTPATIENT)
Dept: OUTPATIENT SERVICES | Facility: HOSPITAL | Age: 75
LOS: 1 days | End: 2025-05-05
Payer: MEDICARE

## 2025-05-05 ENCOUNTER — TRANSCRIPTION ENCOUNTER (OUTPATIENT)
Age: 75
End: 2025-05-05

## 2025-05-05 ENCOUNTER — APPOINTMENT (OUTPATIENT)
Dept: RADIOLOGY | Facility: CLINIC | Age: 75
End: 2025-05-05

## 2025-05-05 ENCOUNTER — APPOINTMENT (OUTPATIENT)
Dept: INTERNAL MEDICINE | Facility: CLINIC | Age: 75
End: 2025-05-05
Payer: MEDICARE

## 2025-05-05 VITALS
TEMPERATURE: 97.7 F | WEIGHT: 160 LBS | DIASTOLIC BLOOD PRESSURE: 63 MMHG | BODY MASS INDEX: 22.9 KG/M2 | RESPIRATION RATE: 16 BRPM | HEART RATE: 70 BPM | HEIGHT: 70 IN | OXYGEN SATURATION: 95 % | SYSTOLIC BLOOD PRESSURE: 107 MMHG

## 2025-05-05 DIAGNOSIS — K21.9 GASTRO-ESOPHAGEAL REFLUX DISEASE WITHOUT ESOPHAGITIS: Chronic | ICD-10-CM

## 2025-05-05 DIAGNOSIS — R05.9 COUGH, UNSPECIFIED: ICD-10-CM

## 2025-05-05 DIAGNOSIS — Z86.39 PERSONAL HISTORY OF OTHER ENDOCRINE, NUTRITIONAL AND METABOLIC DISEASE: Chronic | ICD-10-CM

## 2025-05-05 DIAGNOSIS — Z98.890 OTHER SPECIFIED POSTPROCEDURAL STATES: Chronic | ICD-10-CM

## 2025-05-05 DIAGNOSIS — Z90.89 ACQUIRED ABSENCE OF OTHER ORGANS: Chronic | ICD-10-CM

## 2025-05-05 DIAGNOSIS — N40.0 BENIGN PROSTATIC HYPERPLASIA WITHOUT LOWER URINARY TRACT SYMPTOMS: Chronic | ICD-10-CM

## 2025-05-05 PROCEDURE — 71046 X-RAY EXAM CHEST 2 VIEWS: CPT | Mod: 26

## 2025-05-05 PROCEDURE — 99214 OFFICE O/P EST MOD 30 MIN: CPT

## 2025-05-05 RX ORDER — ALBUTEROL SULFATE 90 UG/1
108 (90 BASE) INHALANT RESPIRATORY (INHALATION)
Qty: 1 | Refills: 2 | Status: ACTIVE | COMMUNITY
Start: 2025-05-05 | End: 1900-01-01

## 2025-05-05 RX ORDER — AMOXICILLIN AND CLAVULANATE POTASSIUM 875; 125 MG/1; MG/1
875-125 TABLET, COATED ORAL
Qty: 1 | Refills: 0 | Status: ACTIVE | COMMUNITY
Start: 2025-05-05 | End: 1900-01-01

## 2025-05-09 ENCOUNTER — APPOINTMENT (OUTPATIENT)
Dept: FAMILY MEDICINE | Facility: CLINIC | Age: 75
End: 2025-05-09
Payer: MEDICARE

## 2025-05-09 DIAGNOSIS — J40 BRONCHITIS, NOT SPECIFIED AS ACUTE OR CHRONIC: ICD-10-CM

## 2025-05-09 PROCEDURE — 99213 OFFICE O/P EST LOW 20 MIN: CPT | Mod: 2W

## 2025-05-09 PROCEDURE — G2211 COMPLEX E/M VISIT ADD ON: CPT | Mod: 2W

## 2025-05-09 RX ORDER — METHYLPREDNISOLONE 4 MG/1
4 TABLET ORAL
Qty: 1 | Refills: 0 | Status: ACTIVE | COMMUNITY
Start: 2025-05-09 | End: 1900-01-01

## 2025-05-21 ENCOUNTER — TRANSCRIPTION ENCOUNTER (OUTPATIENT)
Age: 75
End: 2025-05-21

## 2025-06-18 ENCOUNTER — APPOINTMENT (OUTPATIENT)
Dept: RADIOLOGY | Facility: CLINIC | Age: 75
End: 2025-06-18
Payer: MEDICARE

## 2025-06-18 ENCOUNTER — OUTPATIENT (OUTPATIENT)
Dept: OUTPATIENT SERVICES | Facility: HOSPITAL | Age: 75
LOS: 1 days | End: 2025-06-18

## 2025-06-18 ENCOUNTER — APPOINTMENT (OUTPATIENT)
Dept: PULMONOLOGY | Facility: CLINIC | Age: 75
End: 2025-06-18
Payer: MEDICARE

## 2025-06-18 VITALS
BODY MASS INDEX: 21.76 KG/M2 | DIASTOLIC BLOOD PRESSURE: 58 MMHG | OXYGEN SATURATION: 98 % | TEMPERATURE: 98.3 F | WEIGHT: 152 LBS | HEART RATE: 67 BPM | HEIGHT: 70 IN | SYSTOLIC BLOOD PRESSURE: 111 MMHG

## 2025-06-18 DIAGNOSIS — Z98.890 OTHER SPECIFIED POSTPROCEDURAL STATES: Chronic | ICD-10-CM

## 2025-06-18 DIAGNOSIS — K21.9 GASTRO-ESOPHAGEAL REFLUX DISEASE WITHOUT ESOPHAGITIS: Chronic | ICD-10-CM

## 2025-06-18 DIAGNOSIS — Z87.2 PERSONAL HISTORY OF DISEASES OF THE SKIN AND SUBCUTANEOUS TISSUE: Chronic | ICD-10-CM

## 2025-06-18 DIAGNOSIS — Z86.39 PERSONAL HISTORY OF OTHER ENDOCRINE, NUTRITIONAL AND METABOLIC DISEASE: Chronic | ICD-10-CM

## 2025-06-18 DIAGNOSIS — Z90.89 ACQUIRED ABSENCE OF OTHER ORGANS: Chronic | ICD-10-CM

## 2025-06-18 DIAGNOSIS — N40.0 BENIGN PROSTATIC HYPERPLASIA WITHOUT LOWER URINARY TRACT SYMPTOMS: Chronic | ICD-10-CM

## 2025-06-18 PROCEDURE — 71046 X-RAY EXAM CHEST 2 VIEWS: CPT | Mod: 26

## 2025-06-18 PROCEDURE — 99204 OFFICE O/P NEW MOD 45 MIN: CPT

## 2025-06-18 PROCEDURE — G2211 COMPLEX E/M VISIT ADD ON: CPT

## 2025-06-18 RX ORDER — AZELASTINE 137 UG/1
137 SPRAY, METERED NASAL
Qty: 30 | Refills: 5 | Status: ACTIVE | COMMUNITY
Start: 2025-06-18 | End: 1900-01-01

## 2025-06-18 RX ORDER — FLUTICASONE PROPIONATE 50 UG/1
50 SPRAY NASAL TWICE DAILY
Qty: 1 | Refills: 2 | Status: ACTIVE | COMMUNITY
Start: 2025-06-18 | End: 1900-01-01

## 2025-08-05 ENCOUNTER — APPOINTMENT (OUTPATIENT)
Dept: PULMONOLOGY | Facility: CLINIC | Age: 75
End: 2025-08-05
Payer: MEDICARE

## 2025-08-05 VITALS
TEMPERATURE: 98.3 F | HEART RATE: 61 BPM | WEIGHT: 150 LBS | BODY MASS INDEX: 21.52 KG/M2 | SYSTOLIC BLOOD PRESSURE: 107 MMHG | DIASTOLIC BLOOD PRESSURE: 64 MMHG | OXYGEN SATURATION: 99 %

## 2025-08-05 DIAGNOSIS — R05.9 COUGH, UNSPECIFIED: ICD-10-CM

## 2025-08-05 DIAGNOSIS — R09.82 POSTNASAL DRIP: ICD-10-CM

## 2025-08-05 DIAGNOSIS — J40 BRONCHITIS, NOT SPECIFIED AS ACUTE OR CHRONIC: ICD-10-CM

## 2025-08-05 PROCEDURE — 99214 OFFICE O/P EST MOD 30 MIN: CPT

## 2025-08-08 PROBLEM — R09.82 PND (POST-NASAL DRIP): Status: ACTIVE | Noted: 2025-06-18

## 2025-08-12 ENCOUNTER — APPOINTMENT (OUTPATIENT)
Dept: FAMILY MEDICINE | Facility: CLINIC | Age: 75
End: 2025-08-12
Payer: MEDICARE

## 2025-08-12 ENCOUNTER — TRANSCRIPTION ENCOUNTER (OUTPATIENT)
Age: 75
End: 2025-08-12

## 2025-08-12 DIAGNOSIS — U07.1 COVID-19: ICD-10-CM

## 2025-08-12 PROCEDURE — 99213 OFFICE O/P EST LOW 20 MIN: CPT | Mod: 2W

## 2025-08-12 RX ORDER — NIRMATRELVIR AND RITONAVIR 300-100 MG
20 X 150 MG & KIT ORAL
Qty: 1 | Refills: 0 | Status: ACTIVE | COMMUNITY
Start: 2025-08-12 | End: 1900-01-01

## (undated) DEVICE — PACKING 1/2"

## (undated) DEVICE — ELCTR NDL SUBDERMAL 2 CHANNEL

## (undated) DEVICE — SUT CHROMIC 3-0 27" SH

## (undated) DEVICE — DRAPE VARI-LENS2 MICROSCPOPE 68MM

## (undated) DEVICE — SUT MONOCRYL 5-0 18" P-3 UNDYED

## (undated) DEVICE — DRAPE APERTURE

## (undated) DEVICE — KNIFE ALCON MVR V-LANCE 20G (WHITE)

## (undated) DEVICE — SOL IRR BAG NS 0.9% 3000ML

## (undated) DEVICE — PREP ALCOHOL PAD MED

## (undated) DEVICE — ELCTR BOVIE TIP BLADE INSULATED 2.75" EDGE

## (undated) DEVICE — MARKING PEN W RULER

## (undated) DEVICE — GOWN SLEEVES

## (undated) DEVICE — DRAPE IRRIGATION POUCH 19X23"

## (undated) DEVICE — DRSG KLING 4"

## (undated) DEVICE — DRSG STERISTRIPS 0.5 X 4"

## (undated) DEVICE — DRSG TEGADERM 2.5X3"

## (undated) DEVICE — DURABLE MEDICAL EQUIPMENT: Type: DURABLE MEDICAL EQUIPMENT

## (undated) DEVICE — DRSG MASTISOL

## (undated) DEVICE — NDL NERVE STIM 22GA X 2IN 30 DEG

## (undated) DEVICE — Device

## (undated) DEVICE — DRAIN PENROSE .25" X 18" LATEX

## (undated) DEVICE — PACK TYMPANOMASTOIDECTOMY LF

## (undated) DEVICE — PACK NASAL SINUS KEN 3.5 X 1.2 X 1.2CM

## (undated) DEVICE — GLV 7.5 PROTEXIS (WHITE)

## (undated) DEVICE — STRYKER CORE IRRIGATION DRILL CASSETTE DISP

## (undated) DEVICE — DRSG GAUZE FLUFF 1PLY 18X30"

## (undated) DEVICE — BEAVER BLADE MIN-BLADE ROUNDED TIP 1-SIDE SHARP (GREEN)

## (undated) DEVICE — WARMING BLANKET LOWER ADULT

## (undated) DEVICE — DRAPE STERI-DRAPE MEDIUM W APERTURE

## (undated) DEVICE — PREP BETADINE SPONGE STICKS

## (undated) DEVICE — ACCLARENT SET INFLATION DEVICE

## (undated) DEVICE — SLV COMPRESSION KNEE MED

## (undated) DEVICE — BUR STRYKER DIAMOND ROUND COARSE 4MM